# Patient Record
Sex: MALE | Race: WHITE | Employment: OTHER | URBAN - METROPOLITAN AREA
[De-identification: names, ages, dates, MRNs, and addresses within clinical notes are randomized per-mention and may not be internally consistent; named-entity substitution may affect disease eponyms.]

---

## 2017-07-08 ENCOUNTER — IP HISTORICAL/CONVERTED ENCOUNTER (OUTPATIENT)
Dept: OTHER | Age: 77
End: 2017-07-08

## 2019-07-06 ENCOUNTER — HOSPITAL ENCOUNTER (OUTPATIENT)
Dept: MRI IMAGING | Age: 79
Discharge: HOME | End: 2019-07-06
Attending: ORTHOPAEDIC SURGERY
Payer: MEDICARE

## 2019-07-06 DIAGNOSIS — M48.062 SPINAL STENOSIS, LUMBAR REGION, WITH NEUROGENIC CLAUDICATION: ICD-10-CM

## 2019-07-06 PROCEDURE — 72148 MRI LUMBAR SPINE W/O DYE: CPT

## 2021-07-12 ENCOUNTER — TRANSCRIBE ORDER (OUTPATIENT)
Dept: SCHEDULING | Age: 81
End: 2021-07-12

## 2021-07-12 DIAGNOSIS — M51.36 DDD (DEGENERATIVE DISC DISEASE), LUMBAR: ICD-10-CM

## 2021-07-12 DIAGNOSIS — M54.40 BILATERAL LOW BACK PAIN WITH SCIATICA, SCIATICA LATERALITY UNSPECIFIED, UNSPECIFIED CHRONICITY: Primary | ICD-10-CM

## 2021-07-21 ENCOUNTER — HOSPITAL ENCOUNTER (OUTPATIENT)
Dept: MRI IMAGING | Age: 81
Discharge: HOME | End: 2021-07-21
Attending: ORTHOPAEDIC SURGERY
Payer: MEDICARE

## 2021-07-21 DIAGNOSIS — M51.36 DDD (DEGENERATIVE DISC DISEASE), LUMBAR: ICD-10-CM

## 2021-07-21 DIAGNOSIS — M54.40 BILATERAL LOW BACK PAIN WITH SCIATICA, SCIATICA LATERALITY UNSPECIFIED, UNSPECIFIED CHRONICITY: ICD-10-CM

## 2021-07-21 PROCEDURE — 72148 MRI LUMBAR SPINE W/O DYE: CPT

## 2022-09-09 ENCOUNTER — HOSPITAL ENCOUNTER (OUTPATIENT)
Age: 82
Setting detail: OUTPATIENT SURGERY
Discharge: HOME | End: 2022-09-09
Attending: INTERNAL MEDICINE | Admitting: INTERNAL MEDICINE
Payer: MEDICARE

## 2022-09-09 VITALS
RESPIRATION RATE: 16 BRPM | SYSTOLIC BLOOD PRESSURE: 123 MMHG | OXYGEN SATURATION: 95 % | WEIGHT: 200 LBS | TEMPERATURE: 97.7 F | HEART RATE: 64 BPM | BODY MASS INDEX: 27.09 KG/M2 | DIASTOLIC BLOOD PRESSURE: 63 MMHG | HEIGHT: 72 IN

## 2022-09-09 DIAGNOSIS — R94.39 ABNORMAL STRESS TEST: ICD-10-CM

## 2022-09-09 LAB — INR BLD: 1.2 (ref 0.9–1.2)

## 2022-09-09 PROCEDURE — 77030003390 HC NDL ANGI MRTM -A: Performed by: INTERNAL MEDICINE

## 2022-09-09 PROCEDURE — C1894 INTRO/SHEATH, NON-LASER: HCPCS | Performed by: INTERNAL MEDICINE

## 2022-09-09 PROCEDURE — 99152 MOD SED SAME PHYS/QHP 5/>YRS: CPT | Performed by: INTERNAL MEDICINE

## 2022-09-09 PROCEDURE — 74011250636 HC RX REV CODE- 250/636: Performed by: INTERNAL MEDICINE

## 2022-09-09 PROCEDURE — 85610 PROTHROMBIN TIME: CPT

## 2022-09-09 PROCEDURE — 93458 L HRT ARTERY/VENTRICLE ANGIO: CPT | Performed by: INTERNAL MEDICINE

## 2022-09-09 PROCEDURE — 77030004532 HC CATH ANGI DX IMP BSC -A: Performed by: INTERNAL MEDICINE

## 2022-09-09 PROCEDURE — C1760 CLOSURE DEV, VASC: HCPCS | Performed by: INTERNAL MEDICINE

## 2022-09-09 PROCEDURE — 77030019569 HC BND COMPR RAD TERU -B: Performed by: INTERNAL MEDICINE

## 2022-09-09 PROCEDURE — C1769 GUIDE WIRE: HCPCS | Performed by: INTERNAL MEDICINE

## 2022-09-09 PROCEDURE — 77030040934 HC CATH DIAG DXTERITY MEDT -A: Performed by: INTERNAL MEDICINE

## 2022-09-09 PROCEDURE — 77030013744: Performed by: INTERNAL MEDICINE

## 2022-09-09 PROCEDURE — 77030004522 HC CATH ANGI DX EXPO BSC -A: Performed by: INTERNAL MEDICINE

## 2022-09-09 PROCEDURE — 99153 MOD SED SAME PHYS/QHP EA: CPT | Performed by: INTERNAL MEDICINE

## 2022-09-09 PROCEDURE — 74011000250 HC RX REV CODE- 250: Performed by: INTERNAL MEDICINE

## 2022-09-09 RX ORDER — ACETAMINOPHEN 500 MG
1000 TABLET ORAL
COMMUNITY

## 2022-09-09 RX ORDER — SODIUM CHLORIDE 9 MG/ML
3 INJECTION, SOLUTION INTRAVENOUS CONTINUOUS
Status: DISPENSED | OUTPATIENT
Start: 2022-09-09 | End: 2022-09-09

## 2022-09-09 RX ORDER — MIDAZOLAM HYDROCHLORIDE 1 MG/ML
INJECTION, SOLUTION INTRAMUSCULAR; INTRAVENOUS AS NEEDED
Status: DISCONTINUED | OUTPATIENT
Start: 2022-09-09 | End: 2022-09-09 | Stop reason: HOSPADM

## 2022-09-09 RX ORDER — SODIUM CHLORIDE 9 MG/ML
50 INJECTION, SOLUTION INTRAVENOUS CONTINUOUS
Status: DISCONTINUED | OUTPATIENT
Start: 2022-09-09 | End: 2022-09-09 | Stop reason: HOSPADM

## 2022-09-09 RX ORDER — LIDOCAINE HYDROCHLORIDE 20 MG/ML
INJECTION, SOLUTION INFILTRATION; PERINEURAL AS NEEDED
Status: DISCONTINUED | OUTPATIENT
Start: 2022-09-09 | End: 2022-09-09 | Stop reason: HOSPADM

## 2022-09-09 RX ORDER — HEPARIN SODIUM 1000 [USP'U]/ML
INJECTION, SOLUTION INTRAVENOUS; SUBCUTANEOUS AS NEEDED
Status: DISCONTINUED | OUTPATIENT
Start: 2022-09-09 | End: 2022-09-09 | Stop reason: HOSPADM

## 2022-09-09 RX ORDER — WARFARIN SODIUM 5 MG/1
5 TABLET ORAL DAILY
COMMUNITY

## 2022-09-09 RX ORDER — FENTANYL CITRATE 50 UG/ML
INJECTION, SOLUTION INTRAMUSCULAR; INTRAVENOUS AS NEEDED
Status: DISCONTINUED | OUTPATIENT
Start: 2022-09-09 | End: 2022-09-09 | Stop reason: HOSPADM

## 2022-09-09 RX ORDER — METRONIDAZOLE 7.5 MG/G
LOTION TOPICAL 2 TIMES DAILY
COMMUNITY

## 2022-09-09 RX ORDER — LIDOCAINE HYDROCHLORIDE 10 MG/ML
INJECTION INFILTRATION; PERINEURAL AS NEEDED
Status: DISCONTINUED | OUTPATIENT
Start: 2022-09-09 | End: 2022-09-09 | Stop reason: HOSPADM

## 2022-09-09 RX ORDER — METOPROLOL SUCCINATE 25 MG/1
25 TABLET, EXTENDED RELEASE ORAL DAILY
COMMUNITY

## 2022-09-09 RX ORDER — SODIUM CHLORIDE 9 MG/ML
1.5 INJECTION, SOLUTION INTRAVENOUS CONTINUOUS
Status: DISCONTINUED | OUTPATIENT
Start: 2022-09-09 | End: 2022-09-09 | Stop reason: HOSPADM

## 2022-09-09 RX ORDER — VERAPAMIL HYDROCHLORIDE 2.5 MG/ML
INJECTION, SOLUTION INTRAVENOUS AS NEEDED
Status: DISCONTINUED | OUTPATIENT
Start: 2022-09-09 | End: 2022-09-09 | Stop reason: HOSPADM

## 2022-09-09 RX ADMIN — SODIUM CHLORIDE 3 ML/KG/HR: 9 INJECTION, SOLUTION INTRAVENOUS at 08:00

## 2022-09-09 RX ADMIN — SODIUM CHLORIDE 50 ML/HR: 9 INJECTION, SOLUTION INTRAVENOUS at 07:45

## 2022-09-09 NOTE — Clinical Note
Sheath #2: Sheath: inserted. Sheath inserted/placed in the right femoral  artery. Upon evaluation of the common femoral artery stick using fluoroscopy, the access site puncture was within the safe zone.  6F

## 2022-09-09 NOTE — PROGRESS NOTES
TRANSFER - IN REPORT:    Verbal report received from [address] [facility] on Sebastian Pryor  being received from procedure for routine progression of care. Report consisted of patients Situation, Background, Assessment and Recommendations(SBAR). Information from the following report(s) Procedure Summary, MAR, and Recent Results was reviewed with the receiving clinician. Opportunity for questions and clarification was provided. Assessment completed upon patients arrival to [address] [address] and care assumed. Cardiac Cath Lab Recovery Arrival Note:    Stage[facility] arrived to [facility] recovery area. Patient procedure= LHC. Patient on cardiac monitor, non-invasive blood pressure, SPO2 monitor. On O2 @ 2 lpm via n/c. IV  of nacl on pump at 136 ml/hr. Patient status doing well without problems. Patient is A&Ox 4. Patient reports no complaints. PROCEDURE SITE CHECK:    Procedure site:without any bleeding and or hematoma, no pain/discomfort reported at procedure site. No change in patient status. Continue to monitor patient and status.

## 2022-09-09 NOTE — DISCHARGE INSTRUCTIONS
Radial Cardiac Catheterization / Angiography Discharge Instructions    It is normal to feel tired the first couple days. Take it easy and follow the physicians instructions. CHECK THE CATHETER INSERTION SITE DAILY:  Remove the wrist dressing 24 hours after the procedure. You may shower 24 hours after the procedure. Wash with soap and water and pat dry. Gentle cleaning of the site with soap and water is sufficient, cover with a dry clean dressing or bandage. Do not apply creams or powders to the area. No soaking the wrist for 3 days. coveer the puncture site  after 24 hours post-procedure. CALL THE PHYSICIAN:  If the site becomes red, swollen or feels warm to the touch. If there is bleeding or drainage or if there is unusual pain at the radial site. If there is any minor oozing, you may apply a band-aid and remove after 12 hours. If the bleeding continues, hold pressure with the middle finger against the puncture site and the thumb against the back of the wrist, call 911 to be transported to the hospital.  DO NOT DRIVE YOURSELF, [place] [id number]. ACTIVITY:  For the first 24 hours do not manipulate the wrist.  No lifting, pushing or pulling over 3-5 pounds with the affected wrist for 7 days and no straining the insertion site. Do not lift grocery bags or the garbage can, do not run the vacuum  or  for 7 days. Start with short walks as in the hospital and gradually increase as tolerated each day. It is recommended to walk 30 minutes 5-7 days per week. Follow your physicians instructions on activity. Avoid walking outside in extremes of heat or cold. Walk inside when it is cold and windy or hot and humid. THINGS TO KEEP IN MIND:  No driving for at least 24 hours, or as designated by your physician. Limit the number of times you go up and down the stairs  Take rests and pace yourself with activity.   Be careful and do not strain with bowel movements. MEDICATIONS:  Take all medications as prescribed. Call your physician if you have any questions. Keep an updated list of your medications with you at all times and give a list to your physician and pharmacist.    SIGNS AND SYMPTOMS:  Be cautions of symptoms of angina or recurrent symptoms such as chest discomfort, unusual shortness of breath or fatigue. These could be symptoms of restenosis, a new blockage or a heart attack. If your symptoms are relieved with rest it is still recommended that you notify your physician of recurrent chest pain or discomfort. For CHEST PAIN or symptoms of angina not relieved with rest:  If the discomfort is not relieved with rest and you have been prescribed Nitroglycerin, take as directed (taken under the tongue, one at a time 5 minutes apart for a total of 3 doses). If the discomfort is not relieved after the 3rd nitroglycerin, call 911. AFTER CARE:  Follow up with you physician as instructed. Follow a heart healthy diet with proper portion control, daily stress management, daily      exercise, blood pressure and cholesterol control, and smoking cessation.

## 2022-09-09 NOTE — Clinical Note
Cardiac Cath Lab Procedure Area Arrival Note:    Govind Rick arrived to Cardiac Cath Lab, Procedure Area. Patient identifiers verified with NAME and DATE OF BIRTH. Procedure verified with patient. Consent forms verified. Allergies verified. Patient informed of procedure and plan of care. Questions answered with review. Patient voiced understanding of procedure and plan of care. Patient on cardiac monitor, non-invasive blood pressure, SPO2 monitor. On room air, placed on O2 @ 2 lpm via nasal cannula. IV of NaCl on pump at 272 ml/hr. Patient status doing well without problems. Patient is A&Ox 4. Patient reports no chest pain or SOB. Patient medicated during procedure with orders obtained and verified by Dr. Jacque Cao. Refer to patients Cardiac Cath Lab PROCEDURE REPORT for vital signs, assessment, status, and response during procedure, printed at end of case. Printed report on chart or scanned into chart.

## 2022-09-09 NOTE — PROGRESS NOTES
Cardiac Cath Lab Recovery Arrival Note:      Marco Monday arrived to Cardiac Cath Lab, Recovery Area. Staff introduced to patient. Patient identifiers verified with NAME and DATE OF BIRTH. Procedure verified with patient. Consent forms reviewed and signed by patient or authorized representative and verified. Allergies verified. Patient and family oriented to department. Patient and family informed of procedure and plan of care. Questions answered with review. Patient prepped for procedure, per orders from physician, prior to arrival.    Patient on cardiac monitor, non-invasive blood pressure, SPO2 monitor. On room air. Patient is A&Ox 4. Patient reports left shoulder pain. Patient in stretcher, in low position, with side rails up, call bell within reach, patient instructed to call if assistance as needed. Patient prep in: [address], [place].    Patient family has pager # [phone]  Family in: wife Leanne Noss  [phone]  in hospital.   Prep by: Jennifer Harris RN  [address] Dr [address] Horton in to talk with pt  Pre cath teaching completed

## 2022-09-09 NOTE — Clinical Note
History and physical documented and up to date, allergies reviewed, lab results reviewed, pre-procedure education provided, patient verbalized understanding of procedure, procedural consent signed and patient is NPO. Yes

## 2022-10-17 ENCOUNTER — APPOINTMENT (OUTPATIENT)
Dept: GENERAL RADIOLOGY | Age: 82
DRG: 291 | End: 2022-10-17
Attending: EMERGENCY MEDICINE
Payer: MEDICARE

## 2022-10-17 ENCOUNTER — HOSPITAL ENCOUNTER (INPATIENT)
Age: 82
LOS: 5 days | Discharge: HOME | DRG: 291 | End: 2022-10-22
Attending: STUDENT IN AN ORGANIZED HEALTH CARE EDUCATION/TRAINING PROGRAM | Admitting: INTERNAL MEDICINE
Payer: MEDICARE

## 2022-10-17 DIAGNOSIS — R06.01 ORTHOPNEA: Primary | ICD-10-CM

## 2022-10-17 DIAGNOSIS — I50.23 ACUTE ON CHRONIC SYSTOLIC CONGESTIVE HEART FAILURE (HCC): ICD-10-CM

## 2022-10-17 LAB
ALBUMIN SERPL-MCNC: 3.5 G/DL (ref 3.5–5)
ALBUMIN/GLOB SERPL: 1 {RATIO} (ref 1.1–2.2)
ALP SERPL-CCNC: 61 U/L (ref 45–117)
ALT SERPL-CCNC: 20 U/L (ref 12–78)
ANION GAP SERPL CALC-SCNC: 5 MMOL/L (ref 5–15)
AST SERPL-CCNC: 17 U/L (ref 15–37)
BASOPHILS # BLD: 0.1 K/UL (ref 0–0.1)
BASOPHILS NFR BLD: 1 % (ref 0–1)
BILIRUB SERPL-MCNC: 0.5 MG/DL (ref 0.2–1)
BNP SERPL-MCNC: 5219 PG/ML
BUN SERPL-MCNC: 19 MG/DL (ref 6–20)
BUN/CREAT SERPL: 15 (ref 12–20)
CALCIUM SERPL-MCNC: 8.7 MG/DL (ref 8.5–10.1)
CALCULATED R AXIS, ECG10: 53 DEGREES
CALCULATED T AXIS, ECG11: -119 DEGREES
CHLORIDE SERPL-SCNC: 109 MMOL/L (ref 97–108)
CO2 SERPL-SCNC: 25 MMOL/L (ref 21–32)
COMMENT, HOLDF: NORMAL
CREAT SERPL-MCNC: 1.27 MG/DL (ref 0.7–1.3)
D DIMER PPP FEU-MCNC: 0.42 MG/L FEU (ref 0–0.65)
DIAGNOSIS, 93000: NORMAL
DIFFERENTIAL METHOD BLD: NORMAL
EOSINOPHIL # BLD: 0.1 K/UL (ref 0–0.4)
EOSINOPHIL NFR BLD: 2 % (ref 0–7)
ERYTHROCYTE [DISTWIDTH] IN BLOOD BY AUTOMATED COUNT: 13.5 % (ref 11.5–14.5)
GLOBULIN SER CALC-MCNC: 3.4 G/DL (ref 2–4)
GLUCOSE SERPL-MCNC: 122 MG/DL (ref 65–100)
HCT VFR BLD AUTO: 40 % (ref 36.6–50.3)
HGB BLD-MCNC: 13.4 G/DL (ref 12.1–17)
IMM GRANULOCYTES # BLD AUTO: 0 K/UL (ref 0–0.04)
IMM GRANULOCYTES NFR BLD AUTO: 0 % (ref 0–0.5)
INR PPP: 2.9 (ref 0.9–1.1)
LYMPHOCYTES # BLD: 1.7 K/UL (ref 0.8–3.5)
LYMPHOCYTES NFR BLD: 27 % (ref 12–49)
MCH RBC QN AUTO: 32.5 PG (ref 26–34)
MCHC RBC AUTO-ENTMCNC: 33.5 G/DL (ref 30–36.5)
MCV RBC AUTO: 97.1 FL (ref 80–99)
MONOCYTES # BLD: 0.5 K/UL (ref 0–1)
MONOCYTES NFR BLD: 7 % (ref 5–13)
NEUTS SEG # BLD: 4 K/UL (ref 1.8–8)
NEUTS SEG NFR BLD: 63 % (ref 32–75)
NRBC # BLD: 0 K/UL (ref 0–0.01)
NRBC BLD-RTO: 0 PER 100 WBC
PLATELET # BLD AUTO: 207 K/UL (ref 150–400)
PMV BLD AUTO: 12.1 FL (ref 8.9–12.9)
POTASSIUM SERPL-SCNC: 4.6 MMOL/L (ref 3.5–5.1)
PROT SERPL-MCNC: 6.9 G/DL (ref 6.4–8.2)
PROTHROMBIN TIME: 28.4 SEC (ref 9–11.1)
Q-T INTERVAL, ECG07: 404 MS
QRS DURATION, ECG06: 164 MS
QTC CALCULATION (BEZET), ECG08: 510 MS
RBC # BLD AUTO: 4.12 M/UL (ref 4.1–5.7)
SAMPLES BEING HELD,HOLD: NORMAL
SODIUM SERPL-SCNC: 139 MMOL/L (ref 136–145)
TROPONIN-HIGH SENSITIVITY: 18 NG/L (ref 0–76)
TSH SERPL DL<=0.05 MIU/L-ACNC: 3.27 UIU/ML (ref 0.36–3.74)
VENTRICULAR RATE, ECG03: 96 BPM
WBC # BLD AUTO: 6.3 K/UL (ref 4.1–11.1)

## 2022-10-17 PROCEDURE — 93005 ELECTROCARDIOGRAM TRACING: CPT

## 2022-10-17 PROCEDURE — 71046 X-RAY EXAM CHEST 2 VIEWS: CPT

## 2022-10-17 PROCEDURE — 74011000250 HC RX REV CODE- 250: Performed by: INTERNAL MEDICINE

## 2022-10-17 PROCEDURE — 85025 COMPLETE CBC W/AUTO DIFF WBC: CPT

## 2022-10-17 PROCEDURE — 83880 ASSAY OF NATRIURETIC PEPTIDE: CPT

## 2022-10-17 PROCEDURE — 74011250636 HC RX REV CODE- 250/636: Performed by: INTERNAL MEDICINE

## 2022-10-17 PROCEDURE — 74011250636 HC RX REV CODE- 250/636: Performed by: STUDENT IN AN ORGANIZED HEALTH CARE EDUCATION/TRAINING PROGRAM

## 2022-10-17 PROCEDURE — 74011250637 HC RX REV CODE- 250/637: Performed by: INTERNAL MEDICINE

## 2022-10-17 PROCEDURE — 85379 FIBRIN DEGRADATION QUANT: CPT

## 2022-10-17 PROCEDURE — 84443 ASSAY THYROID STIM HORMONE: CPT

## 2022-10-17 PROCEDURE — 36415 COLL VENOUS BLD VENIPUNCTURE: CPT

## 2022-10-17 PROCEDURE — 84484 ASSAY OF TROPONIN QUANT: CPT

## 2022-10-17 PROCEDURE — 99285 EMERGENCY DEPT VISIT HI MDM: CPT

## 2022-10-17 PROCEDURE — 80053 COMPREHEN METABOLIC PANEL: CPT

## 2022-10-17 PROCEDURE — 65270000046 HC RM TELEMETRY

## 2022-10-17 PROCEDURE — 85610 PROTHROMBIN TIME: CPT

## 2022-10-17 PROCEDURE — 94761 N-INVAS EAR/PLS OXIMETRY MLT: CPT

## 2022-10-17 RX ORDER — WARFARIN SODIUM 5 MG/1
5 TABLET ORAL ONCE
Status: COMPLETED | OUTPATIENT
Start: 2022-10-17 | End: 2022-10-17

## 2022-10-17 RX ORDER — FUROSEMIDE 10 MG/ML
40 INJECTION INTRAMUSCULAR; INTRAVENOUS EVERY 12 HOURS
Status: DISCONTINUED | OUTPATIENT
Start: 2022-10-17 | End: 2022-10-19

## 2022-10-17 RX ORDER — ASPIRIN 81 MG/1
81 TABLET ORAL DAILY
Status: DISCONTINUED | OUTPATIENT
Start: 2022-10-18 | End: 2022-10-22 | Stop reason: HOSPADM

## 2022-10-17 RX ORDER — ONDANSETRON 4 MG/1
4 TABLET, ORALLY DISINTEGRATING ORAL
Status: DISCONTINUED | OUTPATIENT
Start: 2022-10-17 | End: 2022-10-22 | Stop reason: HOSPADM

## 2022-10-17 RX ORDER — ACETAMINOPHEN 650 MG/1
650 SUPPOSITORY RECTAL
Status: DISCONTINUED | OUTPATIENT
Start: 2022-10-17 | End: 2022-10-22 | Stop reason: HOSPADM

## 2022-10-17 RX ORDER — SODIUM CHLORIDE 0.9 % (FLUSH) 0.9 %
5-40 SYRINGE (ML) INJECTION AS NEEDED
Status: DISCONTINUED | OUTPATIENT
Start: 2022-10-17 | End: 2022-10-22 | Stop reason: HOSPADM

## 2022-10-17 RX ORDER — METOPROLOL SUCCINATE 25 MG/1
25 TABLET, EXTENDED RELEASE ORAL
Status: DISCONTINUED | OUTPATIENT
Start: 2022-10-17 | End: 2022-10-22 | Stop reason: HOSPADM

## 2022-10-17 RX ORDER — ONDANSETRON 2 MG/ML
4 INJECTION INTRAMUSCULAR; INTRAVENOUS
Status: DISCONTINUED | OUTPATIENT
Start: 2022-10-17 | End: 2022-10-22 | Stop reason: HOSPADM

## 2022-10-17 RX ORDER — TAMSULOSIN HYDROCHLORIDE 0.4 MG/1
0.4 CAPSULE ORAL 2 TIMES DAILY
Status: DISCONTINUED | OUTPATIENT
Start: 2022-10-17 | End: 2022-10-19

## 2022-10-17 RX ORDER — ACETAMINOPHEN 325 MG/1
650 TABLET ORAL
Status: DISCONTINUED | OUTPATIENT
Start: 2022-10-17 | End: 2022-10-22 | Stop reason: HOSPADM

## 2022-10-17 RX ORDER — FUROSEMIDE 10 MG/ML
20 INJECTION INTRAMUSCULAR; INTRAVENOUS ONCE
Status: COMPLETED | OUTPATIENT
Start: 2022-10-17 | End: 2022-10-17

## 2022-10-17 RX ORDER — POLYETHYLENE GLYCOL 3350 17 G/17G
17 POWDER, FOR SOLUTION ORAL DAILY PRN
Status: DISCONTINUED | OUTPATIENT
Start: 2022-10-17 | End: 2022-10-22 | Stop reason: HOSPADM

## 2022-10-17 RX ORDER — SODIUM CHLORIDE 0.9 % (FLUSH) 0.9 %
5-40 SYRINGE (ML) INJECTION EVERY 8 HOURS
Status: DISCONTINUED | OUTPATIENT
Start: 2022-10-17 | End: 2022-10-22 | Stop reason: HOSPADM

## 2022-10-17 RX ADMIN — SODIUM CHLORIDE, PRESERVATIVE FREE 10 ML: 5 INJECTION INTRAVENOUS at 23:19

## 2022-10-17 RX ADMIN — METOPROLOL SUCCINATE 25 MG: 25 TABLET, FILM COATED, EXTENDED RELEASE ORAL at 23:19

## 2022-10-17 RX ADMIN — TAMSULOSIN HYDROCHLORIDE 0.4 MG: 0.4 CAPSULE ORAL at 23:19

## 2022-10-17 RX ADMIN — FUROSEMIDE 40 MG: 10 INJECTION, SOLUTION INTRAVENOUS at 23:19

## 2022-10-17 RX ADMIN — FUROSEMIDE 20 MG: 10 INJECTION, SOLUTION INTRAVENOUS at 17:10

## 2022-10-17 RX ADMIN — WARFARIN SODIUM 5 MG: 5 TABLET ORAL at 18:52

## 2022-10-17 RX ADMIN — SODIUM CHLORIDE, PRESERVATIVE FREE 10 ML: 5 INJECTION INTRAVENOUS at 18:53

## 2022-10-17 NOTE — CONSULTS
[facility] Cardiology Consult Note    Date of consult:  10/17/22  Date of admission: 10/17/2022  Primary Cardiologist: Dr Mark Thompson  Physician Requesting consult: Dr Rosy Kearns / Reason for consult: \"fluid overload\"    History of the presenting illness:  Ayse Hanna is a 80 y.o. M who presents to the ER today with worsening dyspnea. He was seen in clinic early today by Dr Mark Thompson. Has been noted to have a newly reduced EF associated with LBBB along with shortness of breath. Cardiac cath was done and this showed no significant CAD but confirmed increased LV filling pressure and significantly reduced EF. He has been on lasix 20mg daily but this has not been helping. He has dyspnea on mild exertion and complains of orthopnea. He has a h/o mitral valve disease s/p prior mitral valve repair. He has chronic atrial fibrillation and is on metoprolol for rate control and Coumadin for anticoagulation. Past Medical History:   Diagnosis Date    Arrhythmia     MURMUR    Arthritis     CAD (coronary artery disease)     STENTED 2/14/2000    Chronic pain     spinal stenosis    Hypertension     Other ill-defined conditions(799.89)     increased cholesterol    Other ill-defined conditions(799.89)     subclavian artery occlusion    Other ill-defined conditions(799.89)     PROSTATE HYPERTROPHY       Prior to Admission medications    Medication Sig Start Date End Date Taking? Authorizing Provider   warfarin (COUMADIN) 5 mg tablet Take 5 mg by mouth daily. Sat sun wed  7.5 mg    Provider, Historical   metoprolol succinate (TOPROL-XL) 25 mg XL tablet Take 25 mg by mouth daily. Provider, Historical   metroNIDAZOLE (METROLOTION) 0.75 % lotion Apply  to affected area two (2) times a day. Provider, Historical   acetaminophen (Tylenol Extra Strength) 500 mg tablet Take 1,000 mg by mouth every six (6) hours as needed for Pain.     Provider, Historical   nitroglycerin (NITROSTAT) 0.4 mg SL tablet by SubLINGual route every five (5) minutes as needed for Chest Pain. Provider, Historical   tamsulosin (FLOMAX) 0.4 mg capsule Take 0.4 mg by mouth nightly. Provider, Historical   aspirin 81 mg tablet Take 81 mg by mouth daily. 5/3/11   Provider, Historical   simvastatin (ZOCOR) 40 mg tablet Take  by mouth daily. 5/3/11   Provider, Historical       Allergies   Allergen Reactions    Lipitor [Atorvastatin] Myalgia        Family History   Problem Relation Age of Onset    Colon Cancer Mother     Cancer Mother         colon/breast    Colon Cancer Brother         colon/lung/liver    Breast Cancer Sister     Cancer Father         melanoma    Macular Degen Father     Anesth Problems Neg Hx     Other Sister         thrombosis     No pertinent CV family history    Social History     Socioeconomic History    Marital status:      Spouse name: Not on file    Number of children: Not on file    Years of education: Not on file    Highest education level: Not on file   Occupational History    Not on file   Tobacco Use    Smoking status: Former     Packs/day: 2.00     Years: 15.00     Pack years: 30.00     Types: Cigarettes     Quit date: 5/3/1972     Years since quittin.4    Smokeless tobacco: Never   Substance and Sexual Activity    Alcohol use: Yes     Comment: 2 x week a beer in summer    Drug use: No    Sexual activity: Not on file   Other Topics Concern    Not on file   Social History Narrative    Not on file     Social Determinants of Health     Financial Resource Strain: Not on file   Food Insecurity: Not on file   Transportation Needs: Not on file   Physical Activity: Not on file   Stress: Not on file   Social Connections: Not on file   Intimate Partner Violence: Not on file   Housing Stability: Not on file       Review of Systems   Constitutional:  Negative for chills and fever. HENT:  Negative for hearing loss and nosebleeds. Eyes:  Negative for blurred vision and double vision.    Respiratory:  Positive for shortness of breath. Negative for cough and sputum production. Cardiovascular:  Positive for orthopnea. Negative for chest pain and leg swelling. Gastrointestinal:  Negative for abdominal pain, nausea and vomiting. Genitourinary:  Negative for frequency and urgency. Musculoskeletal:  Negative for back pain and joint pain. Skin:  Negative for itching and rash. Neurological:  Negative for dizziness and headaches. Endo/Heme/Allergies:  Negative for environmental allergies. Does not bruise/bleed easily. Visit Vitals  BP (!) 104/56   Pulse 87   Temp 98 °F (36.7 °C)   Resp 19   SpO2 100%     Physical Exam  HENT:      Head: Normocephalic and atraumatic. Nose: Nose normal.   Eyes:      General: No scleral icterus. Conjunctiva/sclera: Conjunctivae normal.   Cardiovascular:      Rate and Rhythm: Normal rate and regular rhythm. Heart sounds: Normal heart sounds. No murmur heard. No gallop. Pulmonary:      Effort: Pulmonary effort is normal. No respiratory distress. Breath sounds: Normal breath sounds. No stridor. No wheezing. Abdominal:      General: There is no distension. Palpations: Abdomen is soft. Musculoskeletal:         General: No deformity. Normal range of motion. Skin:     General: Skin is warm and dry. Neurological:      General: No focal deficit present. Mental Status: He is alert.        Lab review:  BMP:   Lab Results   Component Value Date/Time     10/17/2022 01:54 PM    K 4.6 10/17/2022 01:54 PM     (H) 10/17/2022 01:54 PM    CO2 25 10/17/2022 01:54 PM    AGAP 5 10/17/2022 01:54 PM     (H) 10/17/2022 01:54 PM    BUN 19 10/17/2022 01:54 PM    CREA 1.27 10/17/2022 01:54 PM        CBC:  Lab Results   Component Value Date/Time    WBC 6.3 10/17/2022 01:54 PM    HGB 13.4 10/17/2022 01:54 PM    HCT 40.0 10/17/2022 01:54 PM    PLATELET 949 91/48/1921 01:54 PM    MCV 97.1 10/17/2022 01:54 PM       All Cardiac Markers in the last 24 hours:    Lab Results Component Value Date/Time    BNPNT 5,219 (H) 10/17/2022 01:54 PM       No results found for: CHOL, CHOLPOCT, CHOLX, CHLST, CHOLV, HDL, HDLPOC, HDLP, LDL, LDLCPOC, LDLC, DLDLP, VLDLC, VLDL, TGLX, TRIGL, TRIGP, TGLPOCT, CHHD, CHHDX     Data review:  EKG tracing personally reviewed: afib, LBBB    Echocardiogram:  From [facility] records: 8/10/22  EF- 20-25%dilated hypertrophied left ventricle. Stable appearing mitral valve repair with appropriate gradient and mild MR. Other imaging:  CXR    IMPRESSION  1. Blunted costophrenic angles suggesting small pleural effusions with  associated passive atelectasis and/or consolidation. Left heart cath 9/9/22  Essentially normal coronaries, co-dominant  Mild to moderate LV systolic dysfunction, EF 35 to 40%  Probable stress cardiomyopathy, anterior and apical hypokinesis  Elevated LVEDP at 23 mmHg    Assessment & Recommendations / Plan:    Acute on chronic systolic heart failure  Failed outpatient therapy  Agree with admission for IV diuretics  -> lasix 40mg bid  -> monitor Is and Os, labs closely  -> try to start Entresto tomorrow if BP and labs allow once he's diuresed a bit    Non-ischemic cardiomyopathy  EF reduced since August  Cath recently did not show any obstructive disease  There was consideration of takotsubo but the time course of this doesn't seem characteristic. 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 Permanent atrial fibrillation  Rate controlled and anticoagulated    Mitral valve disease s/p prior MV repair  Mild residual MR on recent echo    Signed:  Torito Hernandez  Interventional Cardiology  10/17/22

## 2022-10-17 NOTE — ED PROVIDER NOTES
HPI     Date of Service:  10/17/2022    Patient:  Brendan Choi    Chief Complaint:  Shortness of Breath       HPI:  Brendan Choi is a 80 y.o.  male with a past medical history of HTN, CAD, CHF with EF 35-40%, atrial fibrillation on Coumadin who presents for evaluation of shortness of breath. Per patient, he underwent a heart catheterization approximately 1 month ago, after the procedure he followed up with his cardiologist who started him on Lasix. Notes despite the Lasix he is still having shortness of breath. His shortness of breath has been worsening over the last 1 month. States it is particularly worse when laying down at night causing him to have to sleep in a recliner. Also endorses shortness of breath with duration. No chest pain. No leg swelling. Notes he has been taking the Lasix without any relief of his shortness of breath. He denies any associated cough. No other recent illness.     Past Medical History:   Diagnosis Date    Arrhythmia     MURMUR    Arthritis     CAD (coronary artery disease)     STENTED 2/14/2000    Chronic pain     spinal stenosis    Hypertension     Other ill-defined conditions(799.89)     increased cholesterol    Other ill-defined conditions(799.89)     subclavian artery occlusion    Other ill-defined conditions(799.89)     PROSTATE HYPERTROPHY       Past Surgical History:   Procedure Laterality Date    COLONOSCOPY N/A 5/9/2016    COLONOSCOPY performed by Lory Capellan MD at [facility] ENDOSCOPY    HX GI      COLONOSCOPY     [address]    mastoid surgery - object in ear    HX ORTHOPAEDIC  2014    spinal stenosis surgery    HX OTHER SURGICAL  1998    2 subclavian artery stents    VASCULAR SURGERY PROCEDURE UNLIST      CARDIAC CATH WITH STENT         Family History:   Problem Relation Age of Onset    Colon Cancer Mother     Cancer Mother         colon/breast    Colon Cancer Brother         colon/lung/liver    Breast Cancer Sister     Cancer Father         melanoma Macular Degen Father     Anesth Problems Neg Hx     Other Sister         thrombosis       Social History     Socioeconomic History    Marital status:      Spouse name: Not on file    Number of children: Not on file    Years of education: Not on file    Highest education level: Not on file   Occupational History    Not on file   Tobacco Use    Smoking status: Former     Packs/day: 2.00     Years: 15.00     Pack years: 30.00     Types: Cigarettes     Quit date: 5/3/1972     Years since quittin.4    Smokeless tobacco: Never   Substance and Sexual Activity    Alcohol use: Yes     Comment: 2 x week a beer in summer    Drug use: No    Sexual activity: Not on file   Other Topics Concern    Not on file   Social History Narrative    Not on file     Social Determinants of Health     Financial Resource Strain: Not on file   Food Insecurity: Not on file   Transportation Needs: Not on file   Physical Activity: Not on file   Stress: Not on file   Social Connections: Not on file   Intimate Partner Violence: Not on file   Housing Stability: Not on file         ALLERGIES: Lipitor [atorvastatin]    Review of Systems   Constitutional:  Negative for chills and fever. HENT:  Negative for congestion and rhinorrhea. Eyes:  Negative for discharge and redness. Respiratory:  Positive for shortness of breath. Negative for cough. Cardiovascular:  Negative for chest pain and leg swelling. Gastrointestinal:  Negative for abdominal pain, diarrhea, nausea and vomiting. Genitourinary:  Negative for dysuria and hematuria. Musculoskeletal:  Positive for arthralgias. Negative for back pain. Skin:  Negative for rash. Neurological:  Negative for speech difficulty and headaches. Psychiatric/Behavioral:  Negative for agitation and confusion.       Vitals:    10/17/22 1339 10/17/22 1528   BP:  (!) 104/56   Pulse: 92 87   Resp: 18 19   Temp: 98 °F (36.7 °C)    SpO2: 97% 100%            Physical Exam  Vitals and nursing note reviewed. Constitutional:       Appearance: Normal appearance. HENT:      Head: Normocephalic. Eyes:      Extraocular Movements: Extraocular movements intact. Conjunctiva/sclera: Conjunctivae normal.   Cardiovascular:      Rate and Rhythm: Normal rate. Rhythm irregular. Pulses: Normal pulses. Pulmonary:      Effort: Pulmonary effort is normal. No respiratory distress. Breath sounds: Examination of the right-lower field reveals decreased breath sounds. Examination of the left-lower field reveals decreased breath sounds. Decreased breath sounds present. Abdominal:      General: Abdomen is flat. Palpations: Abdomen is soft. Tenderness: There is no abdominal tenderness. Musculoskeletal:         General: Normal range of motion. Right lower leg: No edema. Left lower leg: No edema. Skin:     General: Skin is warm and dry. Capillary Refill: Capillary refill takes less than 2 seconds. Neurological:      General: No focal deficit present. Mental Status: He is alert and oriented to person, place, and time. Psychiatric:         Mood and Affect: Mood normal.         Behavior: Behavior normal.        MDM  Number of Diagnoses or Management Options  Acute on chronic systolic congestive heart failure ([address]  Orthopnea  Diagnosis management comments:     DECISION MAKING:  Devorah Cole is a 80 y.o. male who comes in as above. Arrival to the emergency department patient is afebrile. His vital signs are stable. No hypoxia. On my examination he is mildly tachypneic with speaking, but is speaking in full sentences. He has diminished breath sounds at the bilateral bases. Patient was referred from his cardiologist office for CHF exacerbation with plan for likely admission. Patient's work-up is notable for a BNP of 5219. Chest x-ray does show small pleural effusions bilaterally.   Given he is on outpatient Lasix without response, I feel he requires admission for IV diuresis in conjunction with cardiology consultation. 20mg IV lasix ordered. Perfect Serve Consult for Admission  4:19 PM    ED Room Number: ER03/03  Patient Name and age:  Debbie Mccoy 80 y.o.  male  Working Diagnosis: Orthopnea  (primary encounter diagnosis)  Acute on chronic systolic congestive heart failure ([address]    COVID-19 Suspicion:  no  Sepsis present:  no  Reassessment needed: no  Code Status:  Full Code  Readmission: no  Isolation Requirements:  no  Recommended Level of Care:  telemetry  Department:[facility] Adult ED - 21   Other:  80 y.o.  male with a past medical history of HTN, CAD, CHF with EF 35-40%, atrial fibrillation on Coumadin who presents for evaluation of shortness of breath, sent in my cardiology for CHF exacerbation and admission. IV lasix ordered. ED Course as of 10/17/22 1610   Mon Oct 17, 2022   1350   ED EKG interpretation:  Rhythm: Atrial fibrillation with ventricular response of 96 with left axis deviation and left bundle branch block morphology. ST segment:  No concerning ST elevations or depressions. No recent EKGs in the system to compare to. This EKG was interpreted by Merary Sunshine MD,ED Provider.  [JM]      ED Course User Index  [JM] Tony Vasquez MD       Procedures      LABS:  Recent Results (from the past 6 hour(s))   EKG, 12 LEAD, INITIAL    Collection Time: 10/17/22  1:46 PM   Result Value Ref Range    Ventricular Rate 96 BPM    QRS Duration 164 ms    Q-T Interval 404 ms    QTC Calculation (Bezet) 510 ms    Calculated R Axis 53 degrees    Calculated T Axis -119 degrees    Diagnosis       Atrial fibrillation with premature ventricular or aberrantly conducted   complexes  Left bundle branch block  Abnormal ECG  When compared with ECG of 18-AUG-2014 10:18,  Atrial fibrillation has replaced Sinus rhythm  Left bundle branch block is now present     CBC WITH AUTOMATED DIFF    Collection Time: 10/17/22  1:54 PM   Result Value Ref Range    WBC 6.3 4.1 - 11.1 K/uL    RBC 4.12 4.10 - 5.70 M/uL    HGB 13.4 12.1 - 17.0 g/dL    HCT 40.0 36.6 - 50.3 %    MCV 97.1 80.0 - 99.0 FL    MCH 32.5 26.0 - 34.0 PG    MCHC 33.5 30.0 - 36.5 g/dL    RDW 13.5 11.5 - 14.5 %    PLATELET 466 689 - 954 K/uL    MPV 12.1 8.9 - 12.9 FL    NRBC 0.0 0  WBC    ABSOLUTE NRBC 0.00 0.00 - 0.01 K/uL    NEUTROPHILS 63 32 - 75 %    LYMPHOCYTES 27 12 - 49 %    MONOCYTES 7 5 - 13 %    EOSINOPHILS 2 0 - 7 %    BASOPHILS 1 0 - 1 %    IMMATURE GRANULOCYTES 0 0.0 - 0.5 %    ABS. NEUTROPHILS 4.0 1.8 - 8.0 K/UL    ABS. LYMPHOCYTES 1.7 0.8 - 3.5 K/UL    ABS. MONOCYTES 0.5 0.0 - 1.0 K/UL    ABS. EOSINOPHILS 0.1 0.0 - 0.4 K/UL    ABS. BASOPHILS 0.1 0.0 - 0.1 K/UL    ABS. IMM. GRANS. 0.0 0.00 - 0.04 K/UL    DF AUTOMATED     METABOLIC PANEL, COMPREHENSIVE    Collection Time: 10/17/22  1:54 PM   Result Value Ref Range    Sodium 139 136 - 145 mmol/L    Potassium 4.6 3.5 - 5.1 mmol/L    Chloride 109 (H) 97 - 108 mmol/L    CO2 25 21 - 32 mmol/L    Anion gap 5 5 - 15 mmol/L    Glucose 122 (H) 65 - 100 mg/dL    BUN 19 6 - 20 MG/DL    Creatinine 1.27 0.70 - 1.30 MG/DL    BUN/Creatinine ratio 15 12 - 20      eGFR 56 (L) >60 ml/min/1.73m2    Calcium 8.7 8.5 - 10.1 MG/DL    Bilirubin, total 0.5 0.2 - 1.0 MG/DL    ALT (SGPT) 20 12 - 78 U/L    AST (SGOT) 17 15 - 37 U/L    Alk. phosphatase 61 45 - 117 U/L    Protein, total 6.9 6.4 - 8.2 g/dL    Albumin 3.5 3.5 - 5.0 g/dL    Globulin 3.4 2.0 - 4.0 g/dL    A-G Ratio 1.0 (L) 1.1 - 2.2     SAMPLES BEING HELD    Collection Time: 10/17/22  1:54 PM   Result Value Ref Range    SAMPLES BEING HELD 1RED 1BLU     COMMENT        Add-on orders for these samples will be processed based on acceptable specimen integrity and analyte stability, which may vary by analyte. NT-PRO BNP    Collection Time: 10/17/22  1:54 PM   Result Value Ref Range    NT pro-BNP 5,219 (H) <450 PG/ML        IMAGING:  XR CHEST PA LAT   Final Result   1.  Blunted costophrenic angles suggesting small pleural effusions with   associated passive atelectasis and/or consolidation. Medications During Visit:  Medications - No data to display      IMPRESSION:  1. Orthopnea    2. Acute on chronic systolic congestive heart failure (HCC)        DISPOSITION:  Admitted      Current Discharge Medication List           Follow-up Information    None           The patient is asked to follow-up with their primary care provider in the next several days. They are to call tomorrow for an appointment. The patient is asked to return promptly for any increased concerns or worsening of symptoms. They can return to this emergency department or any other emergency department.

## 2022-10-17 NOTE — ED TRIAGE NOTES
Pt referred by Cardiology for fluid retention. Pt reports taking Lasix with little improvement. Pt c/o SOB.

## 2022-10-17 NOTE — ROUTINE PROCESS
TRANSFER - OUT REPORT:    Verbal report given to Gabriel Davis RN(name) on Raymond Kessler  being transferred to [facility], room 469(unit) for routine progression of care       Report consisted of patients Situation, Background, Assessment and   Recommendations(SBAR). Information from the following report(s) SBAR, ED Summary, Intake/Output, MAR, and Recent Results was reviewed with the receiving nurse. Lines:   Peripheral IV 10/17/22 Right Antecubital (Active)   Site Assessment Clean, dry, & intact 10/17/22 1401   Phlebitis Assessment 0 10/17/22 1401   Infiltration Assessment 0 10/17/22 1401   Dressing Status Clean, dry, & intact 10/17/22 1401   Dressing Type Transparent 10/17/22 1401   Hub Color/Line Status Pink 10/17/22 1401        Opportunity for questions and clarification was provided.       Patient transported with:   [employer]

## 2022-10-17 NOTE — H&P
[facility] Adult  Hospitalist Group  History and Physical    Date of Service:  10/17/2022  Primary Care Provider: Jw Campos MD  Source of information: The patient    Chief Complaint: Shortness of Breath      History of Presenting Illness:   Angie Baez is a 80 y.o. male with known past medical history of chronic systolic heart failure with left ventricular ejection fraction 35%, chronic atrial fibrillation and atrial flutter on Coumadin at therapeutic range, coronary artery disease history of cardiac stent in 2000, left bundle branch block, dyslipidemia, degenerative joint disease spinal stenosis who presents to ED with complaining of shortness of breath that gradually increased in severity. The patient was evaluated as outpatient and was prescribed Lasix 20 mg p.o. daily but despite of that he continue experience worsening of shortness of breath, decrease physical activity, he noted orthopnea. The patient denied having any fever productive cough, chest pain, but he admits he has some tightness in the chest aggravated by activity. He denied having any nausea vomiting abdominal pain or diarrhea, or sick contact. In the emergency department patient was evaluated, laboratory data was obtained, was significant for elevated BNP as 5000, chest x-ray was significant for some small pleural effusion. The patient recently had cardiac cath in September 2022 without significant coronary artery disease. According to patient he did have some work around his house, and was moving boxes. Cardiologist consulted, medicine was consulted for admission and further evaluation.     The patient denies any headache, blurry vision, sore throat, trouble swallowing, trouble with speech, cough, fever, chills, N/V/D, abd pain, urinary symptoms, constipation, recent travels, sick contacts, focal or generalized neurological symptoms, falls, injuries, rashes, contact with COVID-19 diagnosed patients, hematemesis, melena, hemoptysis, hematuria, rashes, denies starting any new medications and denies any other concerns or problems besides as mentioned above. REVIEW OF SYSTEMS:  A comprehensive review of systems was negative except for that written in the History of Present Illness. Past Medical History:   Diagnosis Date    Arrhythmia     MURMUR    Arthritis     CAD (coronary artery disease)     STENTED 2/14/2000    Chronic pain     spinal stenosis    Hypertension     Other ill-defined conditions(799.89)     increased cholesterol    Other ill-defined conditions(799.89)     subclavian artery occlusion    Other ill-defined conditions(799.89)     PROSTATE HYPERTROPHY      Past Surgical History:   Procedure Laterality Date    COLONOSCOPY N/A 5/9/2016    COLONOSCOPY performed by Cristino Haynes MD at [facility] ENDOSCOPY    HX GI      COLONOSCOPY     [address]    mastoid surgery - object in ear    HX ORTHOPAEDIC  2014    spinal stenosis surgery    HX OTHER SURGICAL  1998    2 subclavian artery stents    VASCULAR SURGERY PROCEDURE UNLIST      CARDIAC CATH WITH STENT     Prior to Admission medications    Medication Sig Start Date End Date Taking? Authorizing Provider   warfarin (COUMADIN) 5 mg tablet Take 5 mg by mouth daily. Sat sun wed  7.5 mg    Provider, Historical   metoprolol succinate (TOPROL-XL) 25 mg XL tablet Take 25 mg by mouth daily. Provider, Historical   metroNIDAZOLE (METROLOTION) 0.75 % lotion Apply  to affected area two (2) times a day. Provider, Historical   acetaminophen (Tylenol Extra Strength) 500 mg tablet Take 1,000 mg by mouth every six (6) hours as needed for Pain. Provider, Historical   nitroglycerin (NITROSTAT) 0.4 mg SL tablet by SubLINGual route every five (5) minutes as needed for Chest Pain. Provider, Historical   tamsulosin (FLOMAX) 0.4 mg capsule Take 0.4 mg by mouth nightly. Provider, Historical   aspirin 81 mg tablet Take 81 mg by mouth daily.  5/3/11   Provider, Historical   simvastatin (ZOCOR) 40 mg tablet Take  by mouth daily. 5/3/11   Provider, Historical     Allergies   Allergen Reactions    Lipitor [Atorvastatin] Myalgia      Family History   Problem Relation Age of Onset    Colon Cancer Mother     Cancer Mother         colon/breast    Colon Cancer Brother         colon/lung/liver    Breast Cancer Sister     Cancer Father         melanoma    Macular Degen Father     Anesth Problems Neg Hx     Other Sister         thrombosis      Social History:  reports that he quit smoking about 50 years ago. He has a 30.00 pack-year smoking history. He has never used smokeless tobacco. He reports current alcohol use. He reports that he does not use drugs. Family and social history were personally reviewed, all pertinent and relevant details are outlined as above. Objective:   Visit Vitals  /66   Pulse 74   Temp 98 °F (36.7 °C)   Resp 13   SpO2 93%      O2 Device: None (Room air)    PHYSICAL EXAM:   General: Alert x oriented x 3, awake, no acute distress, resting in bed, pleasant male, appears to be stated age  HEENT: PEERL, EOMI, moist mucus membranes  Neck: Supple, no JVD, no meningeal signs  Chest: Coarse to auscultation bilaterally   CVS: IRIR, Aflutter/Afib, rate controlled, S1 S2 heard, no rubs/gallops  Abd: Soft, non-tender, non-distended, +bowel sounds   Ext: No clubbing, no cyanosis, no edema  Neuro/Psych: Pleasant mood and affect, CN 2-12 grossly intact, sensory grossly within normal limit, Strength 5/5 in all extremities, DTR 1+ x 4  Cap refill: Brisk, less than 3 seconds  Pulses: 2+, symmetric in all extremities  Skin: Warm, dry, without rashes or lesions    Data Review: All diagnostic labs and studies have been reviewed.     Abnormal Labs Reviewed   METABOLIC PANEL, COMPREHENSIVE - Abnormal; Notable for the following components:       Result Value    Chloride 109 (*)     Glucose 122 (*)     eGFR 56 (*)     A-G Ratio 1.0 (*)     All other components within normal limits   NT-PRO BNP - Abnormal; Notable for the following components:    NT pro-BNP 5,219 (*)     All other components within normal limits   PROTHROMBIN TIME + INR - Abnormal; Notable for the following components:    INR 2.9 (*)     Prothrombin time 28.4 (*)     All other components within normal limits       All Micro Results       None            IMAGING:   XR CHEST PA LAT   Final Result   1. Blunted costophrenic angles suggesting small pleural effusions with   associated passive atelectasis and/or consolidation. ECG/ECHO:    Results for orders placed or performed during the hospital encounter of 10/17/22   EKG, 12 LEAD, INITIAL   Result Value Ref Range    Ventricular Rate 96 BPM    QRS Duration 164 ms    Q-T Interval 404 ms    QTC Calculation (Bezet) 510 ms    Calculated R Axis 53 degrees    Calculated T Axis -119 degrees    Diagnosis       Atrial fibrillation with premature ventricular or aberrantly conducted   complexes  Left bundle branch block  Abnormal ECG  When compared with ECG of 18-AUG-2014 10:18,  Atrial fibrillation has replaced Sinus rhythm  Left bundle branch block is now present          Assessment:   Given the patient's current clinical presentation, there is a high level of concern for decompensation if discharged from the emergency department. Complex decision making was performed, which includes reviewing the patient's available past medical records, laboratory results, and imaging studies.     Active Problems:    Acute on chronic systolic HF (heart failure) ([facility]) (10/17/2022)    Shortness of breath    Nonischemic cardiomyopathy    Chronic atrial fibrillation/atrial flutter with left bundle branch block, ventricular rate controlled    Coronary artery disease s/p cardiac stent placement in 2000    Dyslipidemia    Status post mitral valve repair  Plan:     The patient will be admitted to medicine, telemetry unit is inpatient and will require at least 2 midnight for inpatient treatment. IV Lasix was initiated and will be continued. Will monitor urine output and kidney function  Echocardiogram will be obtained. Cardiology was consulted, and will adjust medication. Coumadin will be continued, pharmacy will be consulted to Coumadin dose  Home medication will be reconciled and reinstated  Physical therapy will evaluate patient      DIET: ADULT DIET Regular; Low Fat/Low Chol/High Fiber/2 gm Na   ISOLATION PRECAUTIONS: There are currently no Active Isolations  CODE STATUS: Full Code   DVT PROPHYLAXIS: Coumadin  FUNCTIONAL STATUS PRIOR TO HOSPITALIZATION: Fully active and ambulatory; able to carry on all self-care without restriction. Ambulatory status/function: By self   EARLY MOBILITY ASSESSMENT: Recommend an assessment from physical therapy and/or occupational therapy  ANTICIPATED DISCHARGE: 24-48 hours. ANTICIPATED DISPOSITION: Home with Home Healthcare  EMERGENCY CONTACT/SURROGATE DECISION MAKER: wife    CRITICAL CARE WAS PERFORMED FOR THIS ENCOUNTER: NO.      Signed By: Storm Enriquez MD     October 17, 2022         Please note that this dictation may have been completed with Dragon, the computer voice recognition software. Quite often unanticipated grammatical, syntax, homophones, and other interpretive errors are inadvertently transcribed by the computer software. Please disregard these errors. Please excuse any errors that have escaped final proofreading.

## 2022-10-17 NOTE — PROGRESS NOTES
Pharmacist Note - Warfarin Dosing  Consult provided for this 82 y.o.male to manage warfarin for Atrial Fibrillation    INR Goal: 2 - 3    Home regimen/ tablet size: 7.5 mg Sat, Sun, Wed; 5 mg M/T/Th/Fri    Drugs that may increase INR: None  Drugs that may decrease INR: None  Other current anticoagulants/ drugs that may increase bleeding risk: Aspirin  Risk factors: Age > 65  Daily INR ordered: YES    Recent Labs     10/17/22  1354   HGB 13.4   INR 2.9*     Date               INR                  Dose  10/17  2.9   5 mg                                                                                Assessment/ Plan: Will order warfarin 5 mg PO x 1 dose. Pharmacy will continue to monitor daily and adjust therapy as indicated. Please contact the pharmacist at  for outpatient recommendations if needed.

## 2022-10-18 ENCOUNTER — APPOINTMENT (OUTPATIENT)
Dept: NON INVASIVE DIAGNOSTICS | Age: 82
DRG: 291 | End: 2022-10-18
Attending: INTERNAL MEDICINE
Payer: MEDICARE

## 2022-10-18 LAB
ALBUMIN SERPL-MCNC: 3.6 G/DL (ref 3.5–5)
ALBUMIN/GLOB SERPL: 1.2 {RATIO} (ref 1.1–2.2)
ALP SERPL-CCNC: 62 U/L (ref 45–117)
ALT SERPL-CCNC: 19 U/L (ref 12–78)
ANION GAP SERPL CALC-SCNC: 11 MMOL/L (ref 5–15)
AST SERPL-CCNC: 7 U/L (ref 15–37)
BASOPHILS # BLD: 0.1 K/UL (ref 0–0.1)
BASOPHILS NFR BLD: 1 % (ref 0–1)
BILIRUB SERPL-MCNC: 0.4 MG/DL (ref 0.2–1)
BUN SERPL-MCNC: 25 MG/DL (ref 6–20)
BUN/CREAT SERPL: 24 (ref 12–20)
CALCIUM SERPL-MCNC: 8.9 MG/DL (ref 8.5–10.1)
CHLORIDE SERPL-SCNC: 107 MMOL/L (ref 97–108)
CO2 SERPL-SCNC: 23 MMOL/L (ref 21–32)
CREAT SERPL-MCNC: 1.03 MG/DL (ref 0.7–1.3)
DIFFERENTIAL METHOD BLD: NORMAL
ECHO AO ROOT DIAM: 4.3 CM
ECHO AO ROOT INDEX: 2.03 CM/M2
ECHO AR MAX VEL PISA: 3.3 M/S
ECHO AV AREA PEAK VELOCITY: 2.4 CM2
ECHO AV AREA/BSA PEAK VELOCITY: 1.1 CM2/M2
ECHO AV PEAK GRADIENT: 2 MMHG
ECHO AV PEAK VELOCITY: 0.8 M/S
ECHO AV REGURGITANT PHT: 818.2 MILLISECOND
ECHO AV VELOCITY RATIO: 0.38
ECHO EST RA PRESSURE: 10 MMHG
ECHO LA DIAMETER INDEX: 1.89 CM/M2
ECHO LA DIAMETER: 4 CM
ECHO LA TO AORTIC ROOT RATIO: 0.93
ECHO LV E' LATERAL VELOCITY: 8 CM/S
ECHO LV E' SEPTAL VELOCITY: 5 CM/S
ECHO LV FRACTIONAL SHORTENING: 6 % (ref 28–44)
ECHO LV INTERNAL DIMENSION DIASTOLE INDEX: 3.21 CM/M2
ECHO LV INTERNAL DIMENSION DIASTOLIC: 6.8 CM (ref 4.2–5.9)
ECHO LV INTERNAL DIMENSION SYSTOLIC INDEX: 3.02 CM/M2
ECHO LV INTERNAL DIMENSION SYSTOLIC: 6.4 CM
ECHO LV IVSD: 1 CM (ref 0.6–1)
ECHO LV MASS 2D: 249.9 G (ref 88–224)
ECHO LV MASS INDEX 2D: 117.9 G/M2 (ref 49–115)
ECHO LV POSTERIOR WALL DIASTOLIC: 0.7 CM (ref 0.6–1)
ECHO LV RELATIVE WALL THICKNESS RATIO: 0.21
ECHO LVOT AREA: 6.2 CM2
ECHO LVOT DIAM: 2.8 CM
ECHO LVOT PEAK GRADIENT: 0 MMHG
ECHO LVOT PEAK VELOCITY: 0.3 M/S
ECHO MV AREA PHT: 1.9 CM2
ECHO MV E VELOCITY: 1.36 M/S
ECHO MV E/E' LATERAL: 17
ECHO MV E/E' RATIO (AVERAGED): 22.1
ECHO MV E/E' SEPTAL: 27.2
ECHO MV MAX VELOCITY: 1.5 M/S
ECHO MV MEAN GRADIENT: 3 MMHG
ECHO MV MEAN VELOCITY: 0.7 M/S
ECHO MV PEAK GRADIENT: 9 MMHG
ECHO MV PRESSURE HALF TIME (PHT): 115.9 MS
ECHO MV REGURGITANT ALIASING (NYQUIST) VELOCITY: 28 CM/S
ECHO MV REGURGITANT VELOCITY PISA: 4.6 M/S
ECHO MV REGURGITANT VTIA: 173.1 CM
ECHO MV VTI: 39.7 CM
ECHO PV MAX VELOCITY: 0.4 M/S
ECHO PV PEAK GRADIENT: 1 MMHG
ECHO RIGHT VENTRICULAR SYSTOLIC PRESSURE (RVSP): 24 MMHG
ECHO RV FREE WALL PEAK S': 5 CM/S
ECHO RV TAPSE: 1 CM (ref 1.7–?)
ECHO TV REGURGITANT MAX VELOCITY: 1.9 M/S
ECHO TV REGURGITANT PEAK GRADIENT: 14 MMHG
EOSINOPHIL # BLD: 0.2 K/UL (ref 0–0.4)
EOSINOPHIL NFR BLD: 3 % (ref 0–7)
ERYTHROCYTE [DISTWIDTH] IN BLOOD BY AUTOMATED COUNT: 13.2 % (ref 11.5–14.5)
GLOBULIN SER CALC-MCNC: 3 G/DL (ref 2–4)
GLUCOSE SERPL-MCNC: 109 MG/DL (ref 65–100)
HCT VFR BLD AUTO: 40 % (ref 36.6–50.3)
HGB BLD-MCNC: 13.4 G/DL (ref 12.1–17)
IMM GRANULOCYTES # BLD AUTO: 0 K/UL (ref 0–0.04)
IMM GRANULOCYTES NFR BLD AUTO: 0 % (ref 0–0.5)
INR PPP: 3.1 (ref 0.9–1.1)
LACTATE SERPL-SCNC: 0.6 MMOL/L (ref 0.4–2)
LYMPHOCYTES # BLD: 1.4 K/UL (ref 0.8–3.5)
LYMPHOCYTES NFR BLD: 23 % (ref 12–49)
MAGNESIUM SERPL-MCNC: 2.3 MG/DL (ref 1.6–2.4)
MCH RBC QN AUTO: 32.1 PG (ref 26–34)
MCHC RBC AUTO-ENTMCNC: 33.5 G/DL (ref 30–36.5)
MCV RBC AUTO: 95.7 FL (ref 80–99)
MONOCYTES # BLD: 0.5 K/UL (ref 0–1)
MONOCYTES NFR BLD: 9 % (ref 5–13)
NEUTS SEG # BLD: 4 K/UL (ref 1.8–8)
NEUTS SEG NFR BLD: 64 % (ref 32–75)
NRBC # BLD: 0 K/UL (ref 0–0.01)
NRBC BLD-RTO: 0 PER 100 WBC
PLATELET # BLD AUTO: 166 K/UL (ref 150–400)
PMV BLD AUTO: 11 FL (ref 8.9–12.9)
POTASSIUM SERPL-SCNC: 3.8 MMOL/L (ref 3.5–5.1)
PROT SERPL-MCNC: 6.6 G/DL (ref 6.4–8.2)
PROTHROMBIN TIME: 29.8 SEC (ref 9–11.1)
RBC # BLD AUTO: 4.18 M/UL (ref 4.1–5.7)
SODIUM SERPL-SCNC: 141 MMOL/L (ref 136–145)
WBC # BLD AUTO: 6.3 K/UL (ref 4.1–11.1)

## 2022-10-18 PROCEDURE — 80053 COMPREHEN METABOLIC PANEL: CPT

## 2022-10-18 PROCEDURE — 65270000046 HC RM TELEMETRY

## 2022-10-18 PROCEDURE — 74011250637 HC RX REV CODE- 250/637: Performed by: INTERNAL MEDICINE

## 2022-10-18 PROCEDURE — 85610 PROTHROMBIN TIME: CPT

## 2022-10-18 PROCEDURE — 74011250636 HC RX REV CODE- 250/636: Performed by: INTERNAL MEDICINE

## 2022-10-18 PROCEDURE — 97161 PT EVAL LOW COMPLEX 20 MIN: CPT

## 2022-10-18 PROCEDURE — C8929 TTE W OR WO FOL WCON,DOPPLER: HCPCS

## 2022-10-18 PROCEDURE — 36415 COLL VENOUS BLD VENIPUNCTURE: CPT

## 2022-10-18 PROCEDURE — 83605 ASSAY OF LACTIC ACID: CPT

## 2022-10-18 PROCEDURE — 97116 GAIT TRAINING THERAPY: CPT

## 2022-10-18 PROCEDURE — 97165 OT EVAL LOW COMPLEX 30 MIN: CPT

## 2022-10-18 PROCEDURE — 83735 ASSAY OF MAGNESIUM: CPT

## 2022-10-18 PROCEDURE — 94760 N-INVAS EAR/PLS OXIMETRY 1: CPT

## 2022-10-18 PROCEDURE — 74011000250 HC RX REV CODE- 250: Performed by: INTERNAL MEDICINE

## 2022-10-18 PROCEDURE — 85025 COMPLETE CBC W/AUTO DIFF WBC: CPT

## 2022-10-18 PROCEDURE — 97535 SELF CARE MNGMENT TRAINING: CPT

## 2022-10-18 RX ORDER — WARFARIN 4 MG/1
4 TABLET ORAL ONCE
Status: COMPLETED | OUTPATIENT
Start: 2022-10-18 | End: 2022-10-18

## 2022-10-18 RX ADMIN — WARFARIN SODIUM 4 MG: 4 TABLET ORAL at 17:17

## 2022-10-18 RX ADMIN — SODIUM CHLORIDE, PRESERVATIVE FREE 10 ML: 5 INJECTION INTRAVENOUS at 17:30

## 2022-10-18 RX ADMIN — EMPAGLIFLOZIN 10 MG: 10 TABLET, FILM COATED ORAL at 09:14

## 2022-10-18 RX ADMIN — ASPIRIN 81 MG: 81 TABLET, COATED ORAL at 09:14

## 2022-10-18 RX ADMIN — SODIUM CHLORIDE, PRESERVATIVE FREE 10 ML: 5 INJECTION INTRAVENOUS at 20:50

## 2022-10-18 RX ADMIN — FUROSEMIDE 40 MG: 10 INJECTION, SOLUTION INTRAVENOUS at 09:12

## 2022-10-18 RX ADMIN — FUROSEMIDE 40 MG: 10 INJECTION, SOLUTION INTRAVENOUS at 20:47

## 2022-10-18 RX ADMIN — SACUBITRIL AND VALSARTAN 1 TABLET: 24; 26 TABLET, FILM COATED ORAL at 09:14

## 2022-10-18 RX ADMIN — SACUBITRIL AND VALSARTAN 1 TABLET: 24; 26 TABLET, FILM COATED ORAL at 20:48

## 2022-10-18 RX ADMIN — TAMSULOSIN HYDROCHLORIDE 0.4 MG: 0.4 CAPSULE ORAL at 09:14

## 2022-10-18 RX ADMIN — TAMSULOSIN HYDROCHLORIDE 0.4 MG: 0.4 CAPSULE ORAL at 20:47

## 2022-10-18 RX ADMIN — SODIUM CHLORIDE, PRESERVATIVE FREE 10 ML: 5 INJECTION INTRAVENOUS at 09:15

## 2022-10-18 RX ADMIN — PERFLUTREN 1.5 ML: 6.52 INJECTION, SUSPENSION INTRAVENOUS at 08:52

## 2022-10-18 NOTE — PROGRESS NOTES
Pharmacist Note - Warfarin Dosing  Consult provided for this 82 y.o.male to manage warfarin for Atrial Fibrillation    INR Goal: 2 - 3    Home regimen/ tablet size: 7.5 mg S/S/W and 5 mg M/T/Th/Fri    Drugs that may increase INR: None  Drugs that may decrease INR: None  Other current anticoagulants/ drugs that may increase bleeding risk: Aspirin  Risk factors: Age > 65  Daily INR ordered: YES    Recent Labs     10/18/22  0429 10/17/22  1354   HGB 13.4 13.4   INR 3.1* 2.9*     Date               INR                  Dose  10/17  2.9   5 mg   10/18  3.1   4 mg                                                                                Assessment/ Plan: Will order warfarin 4 mg PO x 1 dose. Pharmacy will continue to monitor daily and adjust therapy as indicated. Please contact the pharmacist at  for outpatient recommendations if needed.

## 2022-10-18 NOTE — PROGRESS NOTES
Patient/family seen: YES       Informed patient/family of BPCI-A Bundle Program. Also advised of potential outreach by Care Transitions Team.    Bundle Payment Care Improvement Beneficiary Letter Delivered to Beneficiary or Representative:YES.  Date BCPI -A was given:10/18/22

## 2022-10-18 NOTE — PROGRESS NOTES
Orders received, chart reviewed and patient evaluated by occupational therapy. Pending progression with skilled acute occupational therapy, recommend:  No skilled occupational therapy/ follow up rehabilitation needs identified at this time. Recommend with nursing ADLs with supervision/setup, OOB to chair 3x/day and toileting via functional mobility to and from bathroom SPV assist and cane. Thank you for completing as able in order to maintain patient strength, endurance and independence. Full evaluation to follow.

## 2022-10-18 NOTE — PROGRESS NOTES
2025 TRANSFER - IN REPORT:    Verbal report received from ED(name) on Juancho Francisco  being received from ED(unit) for routine progression of care      Report consisted of patients Situation, Background, Assessment and   Recommendations(SBAR). Information from the following report(s) SBAR, Kardex, ED Summary, Intake/Output, MAR, Recent Results, and Cardiac Rhythm afib  was reviewed with the receiving nurse. Opportunity for questions and clarification was provided. Assessment completed upon patients arrival to unit and care assumed. Problem: Falls - Risk of  Goal: *Absence of Falls  Description: Document Ral Beer Fall Risk and appropriate interventions in the flowsheet.   Outcome: Progressing Towards Goal  Note: Fall Risk Interventions:            Medication Interventions: Evaluate medications/consider consulting pharmacy, Patient to call before getting OOB, Teach patient to arise slowly         History of Falls Interventions: Bed/chair exit alarm, Investigate reason for fall, Evaluate medications/consider consulting pharmacy

## 2022-10-18 NOTE — PROGRESS NOTES
[facility] Cardiology Progress Note    Date of service: 10/18/2022    Subjective:   Breathing improved a lot  No orthopnea overnight  Responded well to IV lasix    Objective:    Visit Vitals  BP (!) 160/43   Pulse 77   Temp 97.6 °F (36.4 °C)   Resp 24   Wt 89.5 kg (197 lb 5 oz)   SpO2 97%   BMI 26.76 kg/m²       Physical Exam  Constitutional:       Appearance: He is well-developed. HENT:      Head: Normocephalic and atraumatic. Eyes:      General: No scleral icterus. Conjunctiva/sclera: Conjunctivae normal.   Neck:      Vascular: No JVD. Cardiovascular:      Rate and Rhythm: Normal rate. Rhythm irregularly irregular. Heart sounds: Normal heart sounds. No murmur heard. No gallop. Pulmonary:      Effort: Pulmonary effort is normal. No respiratory distress. Breath sounds: Normal breath sounds. No stridor. No wheezing or rales. Abdominal:      General: There is no distension. Palpations: Abdomen is soft. Musculoskeletal:         General: No deformity. Skin:     General: Skin is warm and dry. Neurological:      Mental Status: He is alert and oriented to person, place, and time. Psychiatric:         Behavior: Behavior normal.       Data reviewed:  Recent Results (from the past 12 hour(s))   METABOLIC PANEL, COMPREHENSIVE    Collection Time: 10/18/22  4:29 AM   Result Value Ref Range    Sodium 141 136 - 145 mmol/L    Potassium 3.8 3.5 - 5.1 mmol/L    Chloride 107 97 - 108 mmol/L    CO2 23 21 - 32 mmol/L    Anion gap 11 5 - 15 mmol/L    Glucose 109 (H) 65 - 100 mg/dL    BUN 25 (H) 6 - 20 MG/DL    Creatinine 1.03 0.70 - 1.30 MG/DL    BUN/Creatinine ratio 24 (H) 12 - 20      eGFR >60 >60 ml/min/1.73m2    Calcium 8.9 8.5 - 10.1 MG/DL    Bilirubin, total 0.4 0.2 - 1.0 MG/DL    ALT (SGPT) 19 12 - 78 U/L    AST (SGOT) 7 (L) 15 - 37 U/L    Alk.  phosphatase 62 45 - 117 U/L    Protein, total 6.6 6.4 - 8.2 g/dL    Albumin 3.6 3.5 - 5.0 g/dL    Globulin 3.0 2.0 - 4.0 g/dL    A-G Ratio 1.2 1.1 - 2.2 LACTIC ACID    Collection Time: 10/18/22  4:29 AM   Result Value Ref Range    Lactic acid 0.6 0.4 - 2.0 MMOL/L   MAGNESIUM    Collection Time: 10/18/22  4:29 AM   Result Value Ref Range    Magnesium 2.3 1.6 - 2.4 mg/dL   CBC WITH AUTOMATED DIFF    Collection Time: 10/18/22  4:29 AM   Result Value Ref Range    WBC 6.3 4.1 - 11.1 K/uL    RBC 4.18 4.10 - 5.70 M/uL    HGB 13.4 12.1 - 17.0 g/dL    HCT 40.0 36.6 - 50.3 %    MCV 95.7 80.0 - 99.0 FL    MCH 32.1 26.0 - 34.0 PG    MCHC 33.5 30.0 - 36.5 g/dL    RDW 13.2 11.5 - 14.5 %    PLATELET 443 306 - 620 K/uL    MPV 11.0 8.9 - 12.9 FL    NRBC 0.0 0  WBC    ABSOLUTE NRBC 0.00 0.00 - 0.01 K/uL    NEUTROPHILS 64 32 - 75 %    LYMPHOCYTES 23 12 - 49 %    MONOCYTES 9 5 - 13 %    EOSINOPHILS 3 0 - 7 %    BASOPHILS 1 0 - 1 %    IMMATURE GRANULOCYTES 0 0.0 - 0.5 %    ABS. NEUTROPHILS 4.0 1.8 - 8.0 K/UL    ABS. LYMPHOCYTES 1.4 0.8 - 3.5 K/UL    ABS. MONOCYTES 0.5 0.0 - 1.0 K/UL    ABS. EOSINOPHILS 0.2 0.0 - 0.4 K/UL    ABS. BASOPHILS 0.1 0.0 - 0.1 K/UL    ABS. IMM. GRANS. 0.0 0.00 - 0.04 K/UL    DF AUTOMATED     PROTHROMBIN TIME + INR    Collection Time: 10/18/22  4:29 AM   Result Value Ref Range    INR 3.1 (H) 0.9 - 1.1      Prothrombin time 29.8 (H) 9.0 - 11.1 sec       Assessment and Plan:    Acute on chronic systolic heart failure  Failed outpatient therapy  Agree with admission for IV diuretics  -> lasix 40mg IV bid  -> monitor Is and Os, labs closely  -> start Entresto and Jardiance today for CHF  -> may add aldactone tomorrow if labs allow     Non-ischemic cardiomyopathy  EF reduced since August  Cath recently did not show any obstructive disease  There was consideration of takotsubo but the time course of this doesn't seem characteristic. 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      Permanent atrial fibrillation  Rate controlled and anticoagulated  - continue metoprolol and Coumadin     Mitral valve disease s/p prior MV repair  Mild residual MR on recent echo    Signed:  Torito Garay MD  Interventional Cardiology  10/18/2022

## 2022-10-18 NOTE — DISCHARGE INSTRUCTIONS
Discharge Instructions       PATIENT ID: Norberto Cevallos  MRN: [id number]   YOB: 1940    DATE OF ADMISSION: 10/17/2022    DATE OF DISCHARGE: 10/22/2022      DISCHARGING PROVIDER: Dany Anderson MD    To contact this individual call [phone] and ask the  to page. If unavailable ask to be transferred the Adult Hospitalist Department. DISCHARGE DIAGNOSES and CARE RECOMMENDATIONS:   You were admitted and treated for acute on chronic systolic heart failure)    -- Continue taking Toprol 25 mg, furosemide 40 mg orally daily and Jardiance 10 mg daily. You were tried on another heart medicine called Entresto but had to be stopped due to low blood pressure and acute kidney failure. Your cardiologist would assess to see if you are able to tolerate the full guideline directed medical management for your heart failure. Your cardiologist will also determine if and when you will need a defibrillator. Heart Failure: Care Instructions  Your Care Instructions     Heart failure occurs when your heart does not pump as much blood as the body needs. Failure does not mean that the heart has stopped pumping but rather that it is not pumping as well as it should. Over time, this causes fluid buildup in your lungs and other parts of your body. Fluid buildup can cause shortness of breath, fatigue, swollen ankles, and other problems. By taking medicines regularly, reducing sodium (salt) in your diet, checking your weight every day, and making lifestyle changes, you can feel better and live longer. Follow-up care is a key part of your treatment and safety. Be sure to make and go to all appointments, and call your doctor if you are having problems. It's also a good idea to know your test results and keep a list of the medicines you take. How can you care for yourself at home? Medicines    Be safe with medicines. Take your medicines exactly as prescribed.  Call your doctor if you think you are having a problem with your medicine. Do not take any vitamins, over-the-counter medicine, or herbal products without talking to your doctor first. Carmencita Matta not take ibuprofen (Advil or Motrin) and naproxen (Aleve) without talking to your doctor first. They could make your heart failure worse. You may take some of the following medicine. Angiotensin-converting enzyme inhibitors (ACEIs) or angiotensin II receptor blockers (ARBs) reduce the heart's workload, lower blood pressure, and reduce swelling. Taking an ACEI or ARB may lower your chance of needing to be hospitalized. Beta-blockers can slow heart rate, decrease blood pressure, and improve your condition. Taking a beta-blocker may lower your chance of needing to be hospitalized. Diuretics, also called water pills, reduce swelling. You will get more details on the specific medicines your doctor prescribes. Diet    Your doctor may suggest that you limit sodium. Your doctor can tell you how much sodium is right for you. An example is less than 3,000 mg a day. This includes all the salt you eat in cooking or in packaged foods. People get most of their sodium from processed foods. Fast food and restaurant meals also tend to be very high in sodium. Ask your doctor how much liquid you can drink each day. You may have to limit liquids. Weight    Weigh yourself without clothing at the same time each day. Record your weight. Call your doctor if you have a sudden weight gain, such as more than 2 to 3 pounds in a day or 5 pounds in a week. (Your doctor may suggest a different range of weight gain.) A sudden weight gain may mean that your heart failure is getting worse. Activity level    Start light exercise (if your doctor says it is okay). Even if you can only do a small amount, exercise will help you get stronger, have more energy, and manage your weight and your stress. Walking is an easy way to get exercise.  Start out by walking a little more than you did before. Bit by bit, increase the amount you walk. When you exercise, watch for signs that your heart is working too hard. You are pushing yourself too hard if you cannot talk while you are exercising. If you become short of breath or dizzy or have chest pain, stop, sit down, and rest.     If you feel \"wiped out\" the day after you exercise, walk slower or for a shorter distance until you can work up to a better pace. Get enough rest at night. Sleeping with 1 or 2 pillows under your upper body and head may help you breathe easier. Lifestyle changes    Do not smoke. Smoking can make a heart condition worse. If you need help quitting, talk to your doctor about stop-smoking programs and medicines. These can increase your chances of quitting for good. Quitting smoking may be the most important step you can take to protect your heart. Limit alcohol to 2 drinks a day for men and 1 drink a day for women. Too much alcohol can cause health problems. Avoid getting sick from colds and the flu. Get a pneumococcal vaccine shot. If you have had one before, ask your doctor whether you need another dose. Get a flu shot each year. If you must be around people with colds or the flu, wash your hands often. When should you call for help? Call 911  if you have symptoms of sudden heart failure such as: You have severe trouble breathing. You cough up pink, foamy mucus. You have a new irregular or rapid heartbeat. Call your doctor now or seek immediate medical care if:    You have new or increased shortness of breath. You are dizzy or lightheaded, or you feel like you may faint. You have sudden weight gain, such as more than 2 to 3 pounds in a day or 5 pounds in a week. (Your doctor may suggest a different range of weight gain.)     You have increased swelling in your legs, ankles, or feet. You are suddenly so tired or weak that you cannot do your usual activities.    Watch closely for changes in your health, and be sure to contact your doctor if you develop new symptoms. Where can you learn more? Go to http://www.gray.com/  Enter Z729 in the search box to learn more about \"Heart Failure: Care Instructions. \"  Current as of: January 10, 2022               Content Version: 13.4  © 2006-2022 [employer]. Care instructions adapted under license by [employer] (which disclaims liability or warranty for this information). If you have questions about a medical condition or this instruction, always ask your healthcare professional. [place] [address] any warranty or liability for your use of this information. Orthostatic Hypotension: Care Instructions  Your Care Instructions     Orthostatic hypotension is a quick drop in blood pressure. It happens when you get up from sitting or lying down. You may feel faint, lightheaded, or dizzy. When a person sits up or stands up, the body changes the way it pumps blood. This can slow the flow of blood to the brain for a very short time. And that can make you feel lightheaded. Many medicines can cause this problem, especially in older people. Lack of fluids (dehydration) or illnesses such as diabetes or heart disease also can cause it. Follow-up care is a key part of your treatment and safety. Be sure to make and go to all appointments, and call your doctor if you are having problems. It's also a good idea to know your test results and keep a list of the medicines you take. How can you care for yourself at home? Be safe with medicines. Call your doctor if you think you are having a problem with your medicine. You will get more details on the specific medicines your doctor prescribes. If you feel dizzy or lightheaded, sit down or lie down for a few minutes. Or you can sit down and put your head between your knees.  This will help your blood pressure go back to normal and help your symptoms go away. Follow your doctor's suggestions for ways to prevent symptoms like dizziness. These suggestions may include:  Get up slowly from bed or after sitting for a long time. If you are in bed, roll to your side and swing your legs over the edge of the bed and onto the floor. Push your body up to a sitting position. Wait for a while before you slowly stand up. Avoid or limit alcohol to 2 drinks a day for men and 1 drink a day for women. Alcohol may interfere with your medicine. In addition, alcohol can make your low blood pressure worse by causing your body to lose water. You may Wear compression stockings to help improve blood flow. When should you call for help? Call 911 anytime you think you may need emergency care. For example, call if:    You passed out (lost consciousness). Watch closely for changes in your health, and be sure to contact your doctor if:    You do not get better as expected. Where can you learn more? Go to http://www.esparza.com/  Enter V984 in the search box to learn more about \"Orthostatic Hypotension: Care Instructions. \"  Current as of: January 10, 2022               Content Version: 13.4  © 3340-3371 [employer]. Care instructions adapted under license by [employer] (which disclaims liability or warranty for this information). If you have questions about a medical condition or this instruction, always ask your healthcare professional. [address] any warranty or liability for your use of this information.                            DIET:   Regular Diet and Cardiac Diet      ACTIVITY:   Activity as tolerated          PENDING TEST RESULTS:   At the time of discharge the following test results are still pending:  None              DISCHARGE MEDICATIONS:     My Medications        START taking these medications        Instructions Each Dose to Equal Morning Noon Evening Bedtime   empagliflozin 10 mg tablet  Commonly known as: Josh [place]  Start taking on: October 23, 2022    Your last dose was: Your next dose is: Take 1 Tablet by mouth daily for 30 days. 10 mg                 finasteride 5 mg tablet  Commonly known as: PROSCAR  Start taking on: October 23, 2022    Your last dose was: Your next dose is: Take 1 Tablet by mouth daily for 30 days. 5 mg                 furosemide 40 mg tablet  Commonly known as: LASIX    Your last dose was: Your next dose is: Take 1 Tablet by mouth daily for 30 days. 40 mg                        CONTINUE taking these medications        Instructions Each Dose to Equal Morning Noon Evening Bedtime   aspirin 81 mg tablet    Your last dose was: Your next dose is: Take 81 mg by mouth daily. 81 mg                 metoprolol succinate 25 mg XL tablet  Commonly known as: TOPROL-XL    Your last dose was: Your next dose is: Take 25 mg by mouth daily. 25 mg                 metroNIDAZOLE 0.75 % lotion  Commonly known as: METROLOTION    Your last dose was: Your next dose is:         Apply  to affected area two (2) times a day. nitroglycerin 0.4 mg SL tablet  Commonly known as: NITROSTAT    Your last dose was: Your next dose is:         by SubLINGual route every five (5) minutes as needed for Chest Pain.                  simvastatin 40 mg tablet  Commonly known as: ZOCOR    Your last dose was: Your next dose is: Take  by mouth daily. warfarin 5 mg tablet  Commonly known as: COUMADIN    Your last dose was: Your next dose is: Take 5 mg by mouth daily.  Sat sun wed  7.5 mg   5 mg                        STOP taking these medications      tamsulosin 0.4 mg capsule  Commonly known as: FLOMAX               ASK your doctor about these medications        Instructions Each Dose to Equal Morning Noon Evening Bedtime   acetaminophen 500 mg tablet  Commonly known as: TYLENOL    Your last dose was: Your next dose is: Take 1,000 mg by mouth every six (6) hours as needed for Pain.   1,000 mg                           Where to Get Your Medications        These medications were sent to [facility], [address]  [address], [address]       Phone: [phone]   empagliflozin 10 mg tablet  finasteride 5 mg tablet  furosemide 40 mg tablet             It is important that you take the medication exactly as they are prescribed. Keep your medication in the bottles provided by the pharmacist and keep a list of the medication names, dosages, and times to be taken in your wallet. Do not take other medications without consulting your doctor. NOTIFY YOUR PHYSICIAN FOR ANY OF THE FOLLOWING:  If you develop severe shortness of breath, chest pressure/pain, dizziness, presyncope or syncope call 911 to go to the emergency room. Signed:    Ania Mane MD  10/22/2022  2:32 PM

## 2022-10-18 NOTE — NURSE NAVIGATOR
HEART FAILURE NURSE NAVIGATOR NOTE  [address]    Patient chart was reviewed by Heart Failure (HF) Nurse Navigators for compliance of prescribed treatment with guidelines directed medical therapy (GDMT) and HF database completed. Please, review beneath recommendations for symptomatic patients with HF with Reduced Ejection Fraction ? 40% (HFrEF) for your consideration when taking care of this patient. Current Medical Therapy:    Name Ramin Granados    1940   LVEF Echo pending   NYHA Functional Class Documentation needed   ARNi/ACEi/ARB Entresto   Beta-blocker Toprol  XL   Aldosterone Antagonist Per Dr. Holloway Mail may add if labs allow   SGLT2 inhibitor Jardiance 10 mg daily   Hydralazine/Isosorbide Dinitrate    Consulting Cardiologist: FOREST Holloway Mail     Recommendations:    Please, add the following GDMT for HFrEF ? 40% [Class 1] or document in discharge summary/progress note why patient cannot take the medication:  ARNi/ACEi or ARB  Beta-blockers (carvedilol, sustained-release metoprolol succinate or bisoprolol)  Aldosterone antagonists GFR > 30 and K< 5  SGLT2 inhibitor  Hydralazine/Isosorbide dinitrate for  Americans with Class III/IV symptoms  Adjust diuretic dose at discharge if hospitalized for volume overload    Consider adding the following GDMT for HFrEF ? 40%, if appropriate [Class 2b]:   Ivabradine for patients on maximally tolerated beta-blocker dose in order to achieve HR 70-80bpm  Digoxin, goal level 0.5-0.9  Polyunsaturated fatty acids  Vericuguat  For patient with hyperkalemia while on RAASi > 5.5, consider adding potassium binders (patiromer, sodium zirconium cycosilicate)    Note: the following medications may be potentially harmful in heart failure [Class 3]:  Calcium channel blockers (doxazosin, diltiazem, verapamil, nifedipine)  Antiarrhythmics (flecanide, disopyrimide, sotalol, dronedarone)  Diabetes medications (thiasolidinediones, saxagliptin, alogliptin)  NSAIDs and GREGORY 2 inhibitors    Consider vaccinations for respiratory illnesses (flu, pneumonia, covid) [Class 2b]    For eligible patients with LVEF < 35% consider discharge with wearable defibrillation [Class 2b] and/or referral for ICD implantation [Class 1] for prevention of sudden cardiac death. Due to severely reduced LVEF < 25% and/or recurrent hospitalizations this patient may benefit from referral to Advanced Heart Failure Program to assess suitability for advanced therapies, such as left-ventricular assist device, heart transplantation, palliative inotropes or palliation [Class 1]. To obtain in-patient consultation or refer to [address] [place] [address], call [phone]    Patient Education:     Teach back in heart failure education provided, including information about medical therapy, lifestyle modifications, diet and fluid restrictions, physical activity. Educational resources provided: Living with Heart Failure booklet; Signs/Symptoms magnet; Weight Calendar; [facility] flyer; Preparation for Successful Discharge Checklist.  Information provided about HF support group. Heart failure avoiding triggers on discharge instructions. Plan for Transitional Care:    Post discharge follow up phone call to be made within 48-72 hours of discharge. Patient will follow-up with PCP. Patient will follow-up with Primary Cardiologist.  Obstructive sleep apnea screening done and patient was referred to Sleep Medicine. Referral/follow-up with Cardiac Rehabilitation. Referral/follow-up with Advanced Heart Failure Specialist.  Referral/follow-up with Palliative Care Specialist.      Heart Failure Nurse Navigator  Phone: [phone]  /  [phone]    *Recommendations listed above are based on 2022 AHA/ACC/HFSA Guideline for the Management of Heart Failure:  A Report of the [address] [place] [address] Committee on Clinical Practice Guidelines. Circulation 2022; [id number]. and 2017 AHA/ACC/HRS guideline for management of patients with ventricular arrhythmias and the prevention of sudden cardiac death: A Report of the [employer] of Cardiology/American Heart Association Task Force on Clinical Practice Guidelines and the Heart Rhythm Society.  Heart Rhythm, Vol 15, No 10, October 2018 *Class of Recommendation: Class 1 (strong), Class 2a (moderate), Class 2b (weak), Class 3 (not recommended, potentially harmful)

## 2022-10-18 NOTE — PROGRESS NOTES
Transition of Care Plan  RUR 8%    Disposition Home with wife    Cherie Shaw 1 therapy recommending [facility] PT  2x week Patient declined--- he does not want home PT    Medical follow up  PCP and specialist    Contact  Wife Arvin Garcia  [phone]    Reason for Admission:  acute on chronic systolic heart failure  Hx includes CAD, hypertension                  RUR Score:    8%                 Plan for utilizing home health:      will arrange if ordered and patient agrees     PCP: First and Last name:  Jhoana Wagner MD     Name of Practice: [facility]     Are you a current patient: Yes/No: yes   Approximate date of last visit: 10/3/22   Can you participate in a virtual visit with your PCP:                     Current Advanced Directive/Advance Care Plan: Full Code      Healthcare Decision Maker:   Click here to complete [address] including selection of the Healthcare Decision Maker Relationship (ie \"Primary\")                           Transition of Care Plan:        Home with wife and medical follow up    Patient is declining home health     CM met with patient and sonTex  in patient's room to introduce self and explain role. Patient was alert and oriented  Confirmed demographics, PCP and [place]--[place] Generic [place]   [facility]    Self care and independent prior to admission  HH none  Rehab/SNF None  DME  RW grab bars, straight cane     Patient lives with his wife in one level home- with 4 steps to enter in [facility] . He was self care and independent prior to admission using a cane for ambulation and driving. Did not require assistance. He has two adult sons who are supportive. Patient retired a couple of months ago. CM talked with patient about  the recommendation for home health PT and the benefits from having home health   He politely declined.     Medicare pt has received, reviewed, and signed 1st  IM letter informing them of their right to appeal the discharge. Signed copy has been placed on pt bedside chart. CM will follow and assist with any transition of care needs that arise. [facility] if patient changes his mind and agrees. Family will transport home    Care Management Interventions  PCP Verified by CM: Yes  Last Visit to PCP: 10/03/22  Mode of Transport at Discharge: Other (see comment) (family in car)  Transition of Care Consult (CM Consult):  Other  Occupational Therapy Consult: Yes  Speech Therapy Consult: No  Support Systems: Spouse/Significant Other, Child(carrie), Other Family Member(s)  Confirm Follow Up Transport: Family (self)  Discharge Location  Patient Expects to be Discharged to[de-identified] Home with family assistance

## 2022-10-18 NOTE — PROGRESS NOTES
OCCUPATIONAL THERAPY EVALUATION/DISCHARGE  Patient: Danica Sanders (14 y.o. male)  Date: 10/18/2022  Primary Diagnosis: Acute on chronic systolic HF (heart failure) (HCC) [I50.23]       Precautions:   Fall    ASSESSMENT  Based on the objective data described below, the patient presents with near baseline level of ADL/IADL completion, forward flexed posture due to hx of back surgery, reporting that he's been using the bathroom with SPV. Pt completed bathroom mobility and toilet transfers with mod I and SPC, toilet hygiene independently, and grooming ADLs in standing independently. Pt simulated LB dressing using tailor sitting method. Pt likely at baseline level of ADL/IADL independence, skilled OT not recommended upon discharge. OT to discharge at this time. Current Level of Function (ADLs/self-care): mod I bathroom mobility and standing grooming ADLs    Functional Outcome Measure: The patient scored 95/100 on the Barthel index outcome measure which is indicative of independence with basic self care. Other factors to consider for discharge: home support, PLOF     PLAN :  Recommend with staff: bathroom for toileting and grooming ADLs with cane, SPV    Recommendation for discharge: (in order for the patient to meet his/her long term goals)  No skilled occupational therapy/ follow up rehabilitation needs identified at this time. This discharge recommendation:  Has not yet been discussed the attending provider and/or case management    IF patient discharges home will need the following DME: patient owns DME required for discharge       SUBJECTIVE:   Patient stated my wife is bringing my razor later so I can shave.     OBJECTIVE DATA SUMMARY:   HISTORY:   Past Medical History:   Diagnosis Date    Arrhythmia     MURMUR    Arthritis     CAD (coronary artery disease)     STENTED 2/14/2000    Chronic pain     spinal stenosis    Hypertension     Other ill-defined conditions(799.89)     increased cholesterol Other ill-defined conditions(799.89)     subclavian artery occlusion    Other ill-defined conditions(799.89)     PROSTATE HYPERTROPHY     Past Surgical History:   Procedure Laterality Date    COLONOSCOPY N/A 5/9/2016    COLONOSCOPY performed by Kostas Kelley MD at [facility] ENDOSCOPY    HX GI      COLONOSCOPY     [address] [facility]    mastoid surgery - object in ear    HX ORTHOPAEDIC  2014    spinal stenosis surgery    HX OTHER SURGICAL  1998    2 subclavian artery stents    VASCULAR SURGERY PROCEDURE UNLIST      CARDIAC CATH WITH STENT       Prior Level of Function/Environment/Context: Lives in a 1 story house with wife and 24 yo grandchild, mod I with SPC that he uses in the community but not at home. Independent with ADLs/IADLs. Has a walker available at home, grab bars in the shower. 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: Private residence  # Steps to Enter: 4  One/Two Story Residence: One story  Living Alone: No  Support Systems: Spouse/Significant Other  Patient Expects to be Discharged to[de-identified] Home with family assistance  Current DME Used/Available at Home: [address], rolling, Grab bars, Cane, straight  Tub or Shower Type: Shower    Hand dominance: Right    EXAMINATION OF PERFORMANCE DEFICITS:  Cognitive/Behavioral Status:  Neurologic State: Alert  Orientation Level: Oriented X4  Cognition: Appropriate decision making; Appropriate for age attention/concentration; Appropriate safety awareness; Follows commands  Perception: Appears intact  Perseveration: No perseveration noted  Safety/Judgement: Awareness of environment;Home safety; Insight into deficits    Skin: in tact    Edema: none noted    Hearing:   Auditory  Auditory Impairment: Hard of hearing, right side    Vision/Perceptual:                                Corrective Lenses: Reading glasses    Range of Motion:  AROM: Generally decreased, functional  PROM: Within functional limits                      Strength:  Strength: Generally decreased, functional                Coordination:  Coordination: Within functional limits  Fine Motor Skills-Upper: Left Intact; Right Intact    Gross Motor Skills-Upper: Left Intact; Right Intact    Tone & Sensation:  Tone: Normal  Sensation: Intact                      Balance:  Sitting: Intact; Without support  Standing: Impaired; With support  Standing - Static: Fair  Standing - Dynamic : Fair    Functional Mobility and Transfers for ADLs:  Bed Mobility:  Scooting: Independent    Transfers:  Sit to Stand: Supervision  Stand to Sit: Supervision  Bathroom Mobility: Modified independent  Toilet Transfer : Modified independent; Adaptive equipment    ADL Assessment:  Feeding: Independent    Oral Facial Hygiene/Grooming: Modified Independent    Bathing: Modified independent; Adaptive equipment         Upper Body Dressing: Independent    Lower Body Dressing: Independent    Toileting: Independent                ADL Intervention and task modifications:       Grooming  Position Performed: Standing  Washing Face: Independent  Washing Hands: Independent  Brushing Teeth: Set-up                        Lower Body Dressing Assistance  Socks: Independent  Leg Crossed Method Used: Yes  Position Performed: Seated edge of bed    Toileting  Toileting Assistance: Independent  Bladder Hygiene: Independent  Bowel Hygiene: Independent  Clothing Management: Independent    Cognitive Retraining  Safety/Judgement: Awareness of environment;Home safety; Insight into deficits    Functional Measure:    Barthel Index:  Bathin  Bladder: 10  Bowels: 10  Groomin  Dressing: 10  Feeding: 10  Mobility: 15  Stairs: 5  Toilet Use: 10  Transfer (Bed to Chair and Back): 15  Total: 95/100      The Barthel ADL Index: Guidelines  1. The index should be used as a record of what a patient does, not as a record of what a patient could do.   2. The main aim is to establish degree of independence from any help, physical or verbal, however minor and for whatever reason. 3. The need for supervision renders the patient not independent. 4. A patient's performance should be established using the best available evidence. Asking the patient, friends/relatives and nurses are the usual sources, but direct observation and common sense are also important. However direct testing is not needed. 5. Usually the patient's performance over the preceding 24-48 hours is important, but occasionally longer periods will be relevant. 6. Middle categories imply that the patient supplies over 50 per cent of the effort. 7. Use of aids to be independent is allowed. Score Interpretation (from [address] [facility] 83)    Independent   60-79 Minimally independent   40-59 Partially dependent   20-39 Very dependent   <20 Totally dependent     -Kobe Kim., Barthel, DDainaW. (1965). Functional evaluation: the Barthel Index. [address] [facility] ([address] (1997). The Barthel activities of daily living index: self-reporting versus actual performance in the old (> or = 75 years). Journal [address] 45(7), 14 [facility], EMMANUEL, Gadiel Florence, [place]. (1999). Measuring the change in disability after inpatient rehabilitation; comparison of the responsiveness of the Barthel Index and Functional [place] Measure. Journal of Neurology, Neurosurgery, and Psychiatry, 66(4), 476-477. Zeina Early, [place].HALEY, KWAKU Box, & Randy Snyder MLOU. (2004) Assessment of post-stroke quality of life in cost-effectiveness studies: The usefulness of the Barthel Index and the EuroQoL-5D.  Quality of Life Research, 15, 127-71        Occupational Therapy Evaluation Charge Determination   History Examination Decision-Making   LOW Complexity : Brief history review  LOW Complexity : 1-3 performance deficits relating to physical, cognitive , or psychosocial skils that result in activity limitations and / or participation restrictions  LOW Complexity : No comorbidities that affect functional and no verbal or physical assistance needed to complete eval tasks       Based on the above components, the patient evaluation is determined to be of the following complexity level: LOW   Pain Rating:  None reported    Activity Tolerance:   Good, tolerates ADLs without rest breaks, and SpO2 stable on RA    After treatment patient left in no apparent distress:    Call bell within reach, wife present, sitting EOB    COMMUNICATION/EDUCATION:   The patients plan of care was discussed with: Registered nurse. Thank you for this referral.  [facility]  Time Calculation: 22 mins     Regarding student involvement in patient care:  A student participated in this treatment session. Per CMS Medicare statements and AOTA guidelines I certify that the following was true:  1. I was present and directly observed the entire session. 2. I made all skilled judgments and clinical decisions regarding care. 3. I am the practitioner responsible for assessment, treatment, and documentation.

## 2022-10-18 NOTE — PROGRESS NOTES
[facility] Adult  Hospitalist Group                                                                                          Hospitalist Progress Note  Estiven Uribe MD  Answering service: [phone] OR [phone] from in house phone        Date of Service:  10/18/2022  NAME:  Lyle Hughes  :  1940  MRN:  [id number]      Admission Summary:   Lyle Hughes is a 80 y.o. male with known past medical history of chronic systolic heart failure with left ventricular ejection fraction 35%, chronic atrial fibrillation and atrial flutter on Coumadin at therapeutic range, coronary artery disease history of cardiac stent in , left bundle branch block, dyslipidemia, degenerative joint disease spinal stenosis who presents to ED with complaining of shortness of breath that gradually increased in severity. The patient was evaluated as outpatient and was prescribed Lasix 20 mg p.o. daily but despite of that he continue experience worsening of shortness of breath, decrease physical activity, he noted orthopnea. The patient denied having any fever productive cough, chest pain, but he admits he has some tightness in the chest aggravated by activity. He denied having any nausea vomiting abdominal pain or diarrhea, or sick contact. In the emergency department patient was evaluated, laboratory data was obtained, was significant for elevated BNP as 5000, chest x-ray was significant for some small pleural effusion. The patient recently had cardiac cath in 2022 without significant coronary artery disease. According to patient he did have some work around his house, and was moving boxes. Cardiologist consulted, medicine was consulted for admission and further evaluation. Interval history / Subjective:    Follow up acute on chronic systolic HF, SOB  NAD, improved SOB  Not requiring supplemental O2  UOP -3175 ml  ECHO LVEF 20%     Assessment & Plan:     Acute on chronic systolic HF (heart failure) NYHA II ([facility] [address] (10/17/2022), worsening  Shortness of breath  Nonischemic cardiomyopathy  Continue medical management  Cards consulted input appreciated  Continue Lasix IV  Metoprolol  Entresto and Jardiance were added by cards  ECHO: LVEF 20%      Chronic atrial fibrillation/atrial flutter with left bundle branch block,   ventricular rate controlled  Continue metoprolol and Coumadin , INR in therapeutic range    Coronary artery disease s/p cardiac stent placement in 2000  No chest pain  Continue ASA, statins    Dyslipidemia  Statins, will need to have home medications as pt is allergic to Lipitor     Status post mitral valve repair  Continue coumadin       Code status: Full code  Prophylaxis: coumadin  Care Plan discussed with: patient, RN  Anticipated Disposition: TBD, possible home in 1-2 days when [place] per cardiologist       Hospital Problems  Never Reviewed            Codes Class Noted POA    Acute on chronic systolic HF (heart failure) ([facility] ICD-10-CM: F66.33  ICD-9-CM: 428.23  10/17/2022 Unknown             Review of Systems:   A comprehensive review of systems was negative except for that written in the HPI. Vital Signs:    Last 24hrs VS reviewed since prior progress note. Most recent are:  Visit Vitals  BP (!) 104/47 (BP 1 Location: Right upper arm, BP Patient Position: Lying)   Pulse 68   Temp 97.6 °F (36.4 °C)   Resp 14   Ht 6' (1.829 m)   Wt 89.4 kg (197 lb)   SpO2 97%   BMI 26.72 kg/m²         Intake/Output Summary (Last 24 hours) at 10/18/2022 1551  Last data filed at 10/18/2022 1157  Gross per 24 hour   Intake 720 ml   Output 4275 ml   Net -3555 ml        Physical Examination:     I had a face to face encounter with this patient and independently examined them on 10/18/2022 as outlined below:          Constitutional:  No acute distress, cooperative, pleasant    ENT:  Oral mucosa moist, oropharynx benign. Resp:  Coarse bilaterally. No wheezing/rhonchi/rales.  No accessory muscle use. CV:  IRIR, Afib/Aflutter, normal rate, no gallops, rubs    GI:  Soft, non distended, non tender. normoactive bowel sounds, no hepatosplenomegaly     Musculoskeletal:  No edema, warm, 2+ pulses throughout    Neurologic:  Moves all extremities. AAOx3, CN II-XII reviewed            Data Review:    Review and/or order of clinical lab test    ECHO:    Left Ventricle: Severely reduced left ventricular systolic function with a visually estimated EF of 20 - 25%. Left ventricle is moderately dilated. Normal wall thickness. Severe global hypokinesis present. Aortic Valve: Mild sclerosis of the aortic valve cusp. Mild regurgitation. Mitral Valve: Valve repaired. Mild regurgitation. Left Atrium: Left atrium is mildly dilated. Aorta: Normal sized ascending aorta. Mildly dilated aortic root. Ao Root diameter is 4.3 cm. Contrast used: Definity. Labs:     Recent Labs     10/18/22  0429 10/17/22  1354   WBC 6.3 6.3   HGB 13.4 13.4   HCT 40.0 40.0    207     Recent Labs     10/18/22  0429 10/17/22  1354    139   K 3.8 4.6    109*   CO2 23 25   BUN 25* 19   CREA 1.03 1.27   * 122*   CA 8.9 8.7   MG 2.3  --      Recent Labs     10/18/22  0429 10/17/22  1354   ALT 19 20   AP 62 61   TBILI 0.4 0.5   TP 6.6 6.9   ALB 3.6 3.5   GLOB 3.0 3.4     Recent Labs     10/18/22  0429 10/17/22  1354   INR 3.1* 2.9*   PTP 29.8* 28.4*      No results for input(s): FE, TIBC, PSAT, FERR in the last 72 hours. No results found for: FOL, RBCF   No results for input(s): PH, PCO2, PO2 in the last 72 hours. No results for input(s): CPK, CKNDX, TROIQ in the last 72 hours.     No lab exists for component: CPKMB  No results found for: CHOL, CHOLX, CHLST, CHOLV, HDL, HDLP, LDL, LDLC, DLDLP, TGLX, TRIGL, TRIGP, CHHD, CHHDX  No results found for: [facility]  Lab Results   Component Value Date/Time    Color YELLOW/STRAW 08/18/2014 10:38 AM    Appearance CLEAR 08/18/2014 10:38 AM    Specific gravity 1.017 08/18/2014 10:38 AM    pH (UA) 6.0 08/18/2014 10:38 AM    Protein NEGATIVE 08/18/2014 10:38 AM    Glucose NEGATIVE 08/18/2014 10:38 AM    Ketone NEGATIVE 08/18/2014 10:38 AM    Bilirubin NEGATIVE 08/18/2014 10:38 AM    Urobilinogen 0.2 08/18/2014 10:38 AM    Nitrites NEGATIVE 08/18/2014 10:38 AM    Leukocyte Esterase NEGATIVE 08/18/2014 10:38 AM    Epithelial cells FEW 08/18/2014 10:38 AM    Bacteria NEGATIVE 08/18/2014 10:38 AM    WBC 0-4 08/18/2014 10:38 AM    RBC 0-5 08/18/2014 10:38 AM         Medications Reviewed:     Current Facility-Administered Medications   Medication Dose Route Frequency    empagliflozin (JARDIANCE) tablet 10 mg  10 mg Oral DAILY    warfarin (COUMADIN) tablet 4 mg  4 mg Oral ONCE    sodium chloride (NS) flush 5-40 mL  5-40 mL IntraVENous Q8H    sodium chloride (NS) flush 5-40 mL  5-40 mL IntraVENous PRN    acetaminophen (TYLENOL) tablet 650 mg  650 mg Oral Q6H PRN    Or    acetaminophen (TYLENOL) suppository 650 mg  650 mg Rectal Q6H PRN    polyethylene glycol (MIRALAX) packet 17 g  17 g Oral DAILY PRN    ondansetron (ZOFRAN ODT) tablet 4 mg  4 mg Oral Q8H PRN    Or    ondansetron (ZOFRAN) injection 4 mg  4 mg IntraVENous Q6H PRN    metoprolol succinate (TOPROL-XL) XL tablet 25 mg  25 mg Oral QHS    aspirin delayed-release tablet 81 mg  81 mg Oral DAILY    tamsulosin (FLOMAX) capsule 0.4 mg  0.4 mg Oral BID    furosemide (LASIX) injection 40 mg  40 mg IntraVENous Q12H    sacubitriL-valsartan (ENTRESTO) 24-26 mg tablet 1 Tablet  1 Tablet Oral Q12H    Warfarin - pharmacy to dose   Other Rx Dosing/Monitoring     ______________________________________________________________________  EXPECTED LENGTH OF STAY: - - -  ACTUAL LENGTH OF STAY:          1                 Adolph Bansal MD

## 2022-10-18 NOTE — PROGRESS NOTES
Problem: Mobility Impaired (Adult and Pediatric)  Goal: *Acute Goals and Plan of Care (Insert Text)  Description: FUNCTIONAL STATUS PRIOR TO ADMISSION: Patient was modified independent using a single point cane for functional mobility. No falls. Driving. HOME SUPPORT PRIOR TO ADMISSION: The patient lived with his wife but did not require assist.    Physical Therapy Goals  Initiated 10/18/2022  1. Patient will move from supine to sit and sit to supine , scoot up and down, and roll side to side in bed with modified independence within 7 day(s). 2.  Patient will transfer from bed to chair and chair to bed with modified independence using the least restrictive device within 7 day(s). 3.  Patient will perform sit to stand with modified independence within 7 day(s). 4.  Patient will ambulate with modified independence for 150 feet with the least restrictive device within 7 day(s). 5.  Patient will ascend/descend 4 stairs with right handrail(s) with modified independence within 7 day(s). Outcome: Progressing Towards Goal   PHYSICAL THERAPY EVALUATION  Patient: Valentin Hamilton (49 y.o. male)  Date: 10/18/2022  Primary Diagnosis: Acute on chronic systolic HF (heart failure) ([facility]) [I50.23]  Precautions:   Fall      ASSESSMENT  Based on the objective data described below, the patient presents with impaired balance, mild generalized weakness, forward flexion in standing due to history of lumbar degenerative disc disease, and decreased endurance following admission for CHF and need IV diuretics. Participated in ambulation trial with his personal SPC and required only CGA for safety. SpO2 stable on room air and HR in 130's with ambulation-- SOB noted but stated it was better than when he presented to ED. Would recommend HHPT but patient politely denied. Current Level of Function Impacting Discharge (mobility/balance): CGA    Functional Outcome Measure:   The patient scored Total Score: 16/28 on the Tinetti outcome measure which is indicative of high fall risk. Other factors to consider for discharge: lives with wife, near baseline     Patient will benefit from skilled therapy intervention to address the above noted impairments. PLAN :  Recommendations and Planned Interventions: bed mobility training, transfer training, gait training, therapeutic exercises, neuromuscular re-education, edema management/control, patient and family training/education, and therapeutic activities      Frequency/Duration: Patient will be followed by physical therapy:  3 times a week to address goals. Recommendation for discharge: (in order for the patient to meet his/her long term goals)  Physical therapy at least 2 days/week in the home     This discharge recommendation:  Has been made in collaboration with the attending provider and/or case management    IF patient discharges home will need the following DME: patient owns DME required for discharge         SUBJECTIVE:   Patient stated Oh I'm fine.     OBJECTIVE DATA SUMMARY:   HISTORY:    Past Medical History:   Diagnosis Date    Arrhythmia     MURMUR    Arthritis     CAD (coronary artery disease)     STENTED 2/14/2000    Chronic pain     spinal stenosis    Hypertension     Other ill-defined conditions(799.89)     increased cholesterol    Other ill-defined conditions(799.89)     subclavian artery occlusion    Other ill-defined conditions(799.89)     PROSTATE HYPERTROPHY     Past Surgical History:   Procedure Laterality Date    COLONOSCOPY N/A 5/9/2016    COLONOSCOPY performed by Cristino Haynes MD at [facility] ENDOSCOPY    HX GI      COLONOSCOPY     [address] [facility]    mastoid surgery - object in ear    HX ORTHOPAEDIC  2014    spinal stenosis surgery    HX OTHER SURGICAL  1998    2 subclavian artery stents    VASCULAR SURGERY PROCEDURE UNLIST      CARDIAC CATH WITH STENT       Personal factors and/or comorbidities impacting plan of care: PMH    Home Situation  Home Environment: Private residence  # Steps to Enter: 4  One/Two Story Residence: One story  Living Alone: No  Support Systems: Spouse/Significant Other  Patient Expects to be Discharged to[de-identified] Home with family assistance  Current DME Used/Available at Home: Walker, rolling, Grab bars, Cane, straight  Tub or Shower Type: Shower    EXAMINATION/PRESENTATION/DECISION MAKING:   Critical Behavior:  Neurologic State: Alert  Orientation Level: Oriented X4  Cognition: Appropriate decision making, Appropriate for age attention/concentration, Appropriate safety awareness, Follows commands  Safety/Judgement: Awareness of environment, Home safety, Insight into deficits  Hearing: Auditory  Auditory Impairment: Hard of hearing, right side  Range Of Motion:  AROM: Generally decreased, functional  PROM: Within functional limits  Strength:    Strength: Generally decreased, functional  Tone & Sensation:   Tone: Normal  Sensation: Intact  Coordination:  Coordination: Within functional limits  Vision:   Corrective Lenses: Reading glasses  Functional Mobility:  Bed Mobility:  Scooting: Independent  Transfers:  Sit to Stand: Supervision  Stand to Sit: Supervision  Balance:   Sitting: Intact; Without support  Standing: Impaired; With support  Standing - Static: Fair  Standing - Dynamic : Fair  Ambulation/Gait Training:  Distance (ft): 150 Feet (ft)  Assistive Device: Cane, straight  Ambulation - Level of Assistance: Contact guard assistance  Gait Abnormalities: Decreased step clearance (forward flexion)  Base of Support: Narrowed  Speed/Sherron: Pace decreased (<100 feet/min); Shuffled  Step Length: Right shortened;Left shortened    Functional Measure:  Tinetti test:    Sitting Balance: 1  Arises: 1  Attempts to Rise: 2  Immediate Standing Balance: 1  Standing Balance: 1  Nudged: 0  Eyes Closed: 0  Turn 360 Degrees - Continuous/Discontinuous: 0  Turn 360 Degrees - Steady/Unsteady: 1  Sitting Down: 1  Balance Score: 8 Balance total score  Indication of Gait: 1  R Step Length/Height: 1  L Step Length/Height: 1  R Foot Clearance: 1  L Foot Clearance: 1  Step Symmetry: 1  Step Continuity: 1  Path: 1  Trunk: 0  Walking Time: 0  Gait Score: 8 Gait total score  Total Score: 16/28 Overall total score         Tinetti Tool Score Risk of Falls  <19 = High Fall Risk  19-24 = Moderate Fall Risk  25-28 = Low Fall Risk  Tinetti ME. Performance-Oriented Assessment of Mobility Problems in Elderly Patients. [facility]; [id number]. (Scoring Description: PT Bulletin Feb. 10, 1993)    Older adults: Carmen Craig et al, 2009; n = 1000 [facility] elderly evaluated with ABC, KENYON, ADL, and IADL)  · Mean KENYON score for males aged 69-68 years = 26.21(3.40)  · Mean KENYON score for females age 69-68 years = 25.16(4.30)  · Mean KENYON score for males over 80 years = 23.29(6.02)  · Mean KENYON score for females over 80 years = 17.20(8.32)        Physical Therapy Evaluation Charge Determination   History Examination Presentation Decision-Making   HIGH Complexity :3+ comorbidities / personal factors will impact the outcome/ POC  HIGH Complexity : 4+ Standardized tests and measures addressing body structure, function, activity limitation and / or participation in recreation  LOW Complexity : Stable, uncomplicated  Other outcome measures TInetti 16/28  HIGH       Based on the above components, the patient evaluation is determined to be of the following complexity level: LOW     Pain Rating:  Denied pain    Activity Tolerance:   Good and requires rest breaks      After treatment patient left in no apparent distress:   Call bell within reach and sitting EOB    COMMUNICATION/EDUCATION:   The patients plan of care was discussed with: Occupational therapist and Registered nurse. Fall prevention education was provided and the patient/caregiver indicated understanding., Patient/family have participated as able in goal setting and plan of care. , and Patient/family agree to work toward stated goals and plan of care.     Thank you for this referral.  Angelic Wise, PT, DPT   Time Calculation: 16 mins

## 2022-10-19 LAB
ALBUMIN SERPL-MCNC: 3.4 G/DL (ref 3.5–5)
ALBUMIN/GLOB SERPL: 1 {RATIO} (ref 1.1–2.2)
ALP SERPL-CCNC: 68 U/L (ref 45–117)
ALT SERPL-CCNC: 18 U/L (ref 12–78)
ANION GAP SERPL CALC-SCNC: 9 MMOL/L (ref 5–15)
AST SERPL-CCNC: 10 U/L (ref 15–37)
BILIRUB SERPL-MCNC: 0.5 MG/DL (ref 0.2–1)
BUN SERPL-MCNC: 29 MG/DL (ref 6–20)
BUN/CREAT SERPL: 22 (ref 12–20)
CALCIUM SERPL-MCNC: 8.8 MG/DL (ref 8.5–10.1)
CHLORIDE SERPL-SCNC: 102 MMOL/L (ref 97–108)
CO2 SERPL-SCNC: 24 MMOL/L (ref 21–32)
CREAT SERPL-MCNC: 1.34 MG/DL (ref 0.7–1.3)
ERYTHROCYTE [DISTWIDTH] IN BLOOD BY AUTOMATED COUNT: 13.2 % (ref 11.5–14.5)
GLOBULIN SER CALC-MCNC: 3.5 G/DL (ref 2–4)
GLUCOSE SERPL-MCNC: 95 MG/DL (ref 65–100)
HCT VFR BLD AUTO: 44 % (ref 36.6–50.3)
HGB BLD-MCNC: 14.7 G/DL (ref 12.1–17)
INR PPP: 2.7 (ref 0.9–1.1)
MCH RBC QN AUTO: 32.2 PG (ref 26–34)
MCHC RBC AUTO-ENTMCNC: 33.4 G/DL (ref 30–36.5)
MCV RBC AUTO: 96.5 FL (ref 80–99)
NRBC # BLD: 0 K/UL (ref 0–0.01)
NRBC BLD-RTO: 0 PER 100 WBC
PLATELET # BLD AUTO: 195 K/UL (ref 150–400)
PMV BLD AUTO: 11.7 FL (ref 8.9–12.9)
POTASSIUM SERPL-SCNC: 3.8 MMOL/L (ref 3.5–5.1)
PROT SERPL-MCNC: 6.9 G/DL (ref 6.4–8.2)
PROTHROMBIN TIME: 26.9 SEC (ref 9–11.1)
RBC # BLD AUTO: 4.56 M/UL (ref 4.1–5.7)
SODIUM SERPL-SCNC: 135 MMOL/L (ref 136–145)
WBC # BLD AUTO: 7.2 K/UL (ref 4.1–11.1)

## 2022-10-19 PROCEDURE — 65270000046 HC RM TELEMETRY

## 2022-10-19 PROCEDURE — 85610 PROTHROMBIN TIME: CPT

## 2022-10-19 PROCEDURE — 74011250637 HC RX REV CODE- 250/637: Performed by: INTERNAL MEDICINE

## 2022-10-19 PROCEDURE — 36415 COLL VENOUS BLD VENIPUNCTURE: CPT

## 2022-10-19 PROCEDURE — 80053 COMPREHEN METABOLIC PANEL: CPT

## 2022-10-19 PROCEDURE — 85027 COMPLETE CBC AUTOMATED: CPT

## 2022-10-19 PROCEDURE — 74011000250 HC RX REV CODE- 250: Performed by: INTERNAL MEDICINE

## 2022-10-19 RX ORDER — WARFARIN SODIUM 5 MG/1
5 TABLET ORAL ONCE
Status: COMPLETED | OUTPATIENT
Start: 2022-10-19 | End: 2022-10-19

## 2022-10-19 RX ORDER — TAMSULOSIN HYDROCHLORIDE 0.4 MG/1
0.4 CAPSULE ORAL DAILY
Status: DISCONTINUED | OUTPATIENT
Start: 2022-10-20 | End: 2022-10-22 | Stop reason: HOSPADM

## 2022-10-19 RX ORDER — BUMETANIDE 1 MG/1
1 TABLET ORAL DAILY
Status: DISCONTINUED | OUTPATIENT
Start: 2022-10-19 | End: 2022-10-20

## 2022-10-19 RX ORDER — MIDODRINE HYDROCHLORIDE 5 MG/1
2.5 TABLET ORAL
Status: DISCONTINUED | OUTPATIENT
Start: 2022-10-19 | End: 2022-10-21

## 2022-10-19 RX ADMIN — ASPIRIN 81 MG: 81 TABLET, COATED ORAL at 09:25

## 2022-10-19 RX ADMIN — SODIUM CHLORIDE, PRESERVATIVE FREE 10 ML: 5 INJECTION INTRAVENOUS at 16:20

## 2022-10-19 RX ADMIN — EMPAGLIFLOZIN 10 MG: 10 TABLET, FILM COATED ORAL at 09:25

## 2022-10-19 RX ADMIN — SACUBITRIL AND VALSARTAN 1 TABLET: 24; 26 TABLET, FILM COATED ORAL at 09:25

## 2022-10-19 RX ADMIN — SACUBITRIL AND VALSARTAN 1 TABLET: 24; 26 TABLET, FILM COATED ORAL at 20:39

## 2022-10-19 RX ADMIN — SODIUM CHLORIDE, PRESERVATIVE FREE 10 ML: 5 INJECTION INTRAVENOUS at 04:32

## 2022-10-19 RX ADMIN — MIDODRINE HYDROCHLORIDE 2.5 MG: 5 TABLET ORAL at 16:20

## 2022-10-19 RX ADMIN — BUMETANIDE 1 MG: 1 TABLET ORAL at 09:25

## 2022-10-19 RX ADMIN — WARFARIN SODIUM 5 MG: 5 TABLET ORAL at 16:20

## 2022-10-19 NOTE — PROGRESS NOTES
0730: Bedside shift change report given to Chelle Gray and Leandro Delgado RN (oncoming nurse) by JULIO CESAR Crystal (offgoing nurse). Report included the following information SBAR, MAR, and Recent Results. 1930: Bedside shift change report given to Anabel Gar RN (oncoming nurse) by Jordana Mcneil (offgoing nurse). Report included the following information SBAR, MAR, and Recent Results. Charting and patient care of Angie Baez by Sherran Hamman, RN from 0730 to 8587 was supervised and reviewed by this RN.

## 2022-10-19 NOTE — PROGRESS NOTES
0730: Bedside shift change report given to Heather Jackson, JULIO CESAR and Cassius Marcos RN (oncoming nurse) by  Jacek RN (offgoing nurse). Report included the following information SBAR. 1930: Bedside shift change report given to [facility], RN (oncoming nurse) by Cassius Marcos RN and Heather Jackson RN (offgoing nurse). Report included the following information SBAR.

## 2022-10-19 NOTE — PROGRESS NOTES
[address] [facility] Adult  Hospitalist Group                                                                                          Hospitalist Progress Note  Pop Damon MD  Answering service: [phone] from in house phone        Date of Service:  10/19/2022  NAME:  Figueroa Lopez  :  1940  MRN:  [id number]      Admission Summary:   Figueroa Lopez is a 80 y.o. male with known past medical history of chronic systolic heart failure with left ventricular ejection fraction 35%, chronic atrial fibrillation and atrial flutter on Coumadin at therapeutic range, coronary artery disease history of cardiac stent in , left bundle branch block, dyslipidemia, degenerative joint disease spinal stenosis who presents to ED with complaining of shortness of breath that gradually increased in severity. The patient was evaluated as outpatient and was prescribed Lasix 20 mg p.o. daily but despite of that he continue experience worsening of shortness of breath, decrease physical activity, he noted orthopnea. The patient denied having any fever productive cough, chest pain, but he admits he has some tightness in the chest aggravated by activity. He denied having any nausea vomiting abdominal pain or diarrhea, or sick contact. In the emergency department patient was evaluated, laboratory data was obtained, was significant for elevated BNP as 5000, chest x-ray was significant for some small pleural effusion. The patient recently had cardiac cath in 2022 without significant coronary artery disease. According to patient he did have some work around his house, and was moving boxes. Cardiologist consulted, medicine was consulted for admission and further evaluation. Interval history / Subjective:    Follow up acute on chronic systolic HF, SOB  NAD, improved SOB  Not requiring supplemental O2  UOP -2450 ml  ECHO LVEF 20%   Still weak and SOB with activity Assessment & Plan:     Acute on chronic systolic HF (heart failure) NYHA II ([facility] (10/17/2022), worsening  Shortness of breath  Nonischemic cardiomyopathy  Hypotension  Continue medical management  Cards consulted input appreciated  Continue diuretics, Lasix IV changed to Bumex PO  Metoprolol  Entresto and Jardiance were added by cards  ECHO: LVEF 20%   BP is low, SBP 80s  10/19: Metoprolol on hold in am due to hypotension  Medications adjustment per cards  Add midodrine TID        Chronic atrial fibrillation/atrial flutter with left bundle branch block,   ventricular rate controlled  Continue metoprolol and Coumadin, INR in therapeutic range    Coronary artery disease s/p cardiac stent placement in 2000  No chest pain  Continue ASA, statins, will need to have home medications as pt is allergic to Lipitor    Dyslipidemia  Statins, will need to have home medications as pt is allergic to Lipitor     Status post mitral valve repair  Continue coumadin  INR 2.7       Code status: Full code  Prophylaxis: coumadin  Care Plan discussed with: patient, RN  Anticipated Disposition: TBD, when condition of the patient improved and BP is controlled       Hospital Problems  Never Reviewed            Codes Class Noted POA    Acute on chronic systolic HF (heart failure) ([facility] ICD-10-CM: R86.58  ICD-9-CM: 428.23  10/17/2022 Unknown           Review of Systems:   A comprehensive review of systems was negative except for that written in the HPI. Vital Signs:    Last 24hrs VS reviewed since prior progress note.  Most recent are:  Visit Vitals  BP (!) 98/52 (BP 1 Location: Left upper arm, BP Patient Position: At rest;Lying)   Pulse 81   Temp 98.1 °F (36.7 °C)   Resp 18   Ht 6' (1.829 m)   Wt 89.4 kg (197 lb 1.5 oz)   SpO2 97%   BMI 26.73 kg/m²         Intake/Output Summary (Last 24 hours) at 10/19/2022 1138  Last data filed at 10/19/2022 0800  Gross per 24 hour   Intake 775 ml   Output 1950 ml   Net -1175 ml          Physical Examination:     I had a face to face encounter with this patient and independently examined them on 10/19/2022 as outlined below:          Constitutional:  No acute distress, cooperative, pleasant, + weak   ENT:  Oral mucosa moist, oropharynx benign. Resp:  Coarse bilaterally. No wheezing/rhonchi/rales. No accessory muscle use. CV:  IRIR, Afib/Aflutter, normal rate, no gallops, rubs    GI:  Soft, non distended, non tender. normoactive bowel sounds, no hepatosplenomegaly     Musculoskeletal:  No edema, warm, 2+ pulses throughout    Neurologic:  Moves all extremities. AAOx3, CN II-XII reviewed            Data Review:    Review and/or order of clinical lab test    ECHO:    Left Ventricle: Severely reduced left ventricular systolic function with a visually estimated EF of 20 - 25%. Left ventricle is moderately dilated. Normal wall thickness. Severe global hypokinesis present. Aortic Valve: Mild sclerosis of the aortic valve cusp. Mild regurgitation. Mitral Valve: Valve repaired. Mild regurgitation. Left Atrium: Left atrium is mildly dilated. Aorta: Normal sized ascending aorta. Mildly dilated aortic root. Ao Root diameter is 4.3 cm. Contrast used: Definity. Labs:     Recent Labs     10/19/22  0433 10/18/22  0429   WBC 7.2 6.3   HGB 14.7 13.4   HCT 44.0 40.0    166       Recent Labs     10/19/22  0433 10/18/22  0429 10/17/22  1354   * 141 139   K 3.8 3.8 4.6    107 109*   CO2 24 23 25   BUN 29* 25* 19   CREA 1.34* 1.03 1.27   GLU 95 109* 122*   CA 8.8 8.9 8.7   MG  --  2.3  --        Recent Labs     10/19/22  0433 10/18/22  0429 10/17/22  1354   ALT 18 19 20   AP 68 62 61   TBILI 0.5 0.4 0.5   TP 6.9 6.6 6.9   ALB 3.4* 3.6 3.5   GLOB 3.5 3.0 3.4       Recent Labs     10/19/22  0433 10/18/22  0429 10/17/22  1354   INR 2.7* 3.1* 2.9*   PTP 26.9* 29.8* 28.4*        No results for input(s): FE, TIBC, PSAT, FERR in the last 72 hours.    No results found for: FOL, RBCF   No results for input(s): PH, PCO2, PO2 in the last 72 hours. No results for input(s): CPK, CKNDX, TROIQ in the last 72 hours.     No lab exists for component: CPKMB  No results found for: CHOL, CHOLX, CHLST, CHOLV, HDL, HDLP, LDL, LDLC, DLDLP, TGLX, TRIGL, TRIGP, CHHD, CHHDX  No results found for: [facility]  Lab Results   Component Value Date/Time    Color YELLOW/STRAW 08/18/2014 10:38 AM    Appearance CLEAR 08/18/2014 10:38 AM    Specific gravity 1.017 08/18/2014 10:38 AM    pH (UA) 6.0 08/18/2014 10:38 AM    Protein NEGATIVE 08/18/2014 10:38 AM    Glucose NEGATIVE 08/18/2014 10:38 AM    Ketone NEGATIVE 08/18/2014 10:38 AM    Bilirubin NEGATIVE 08/18/2014 10:38 AM    Urobilinogen 0.2 08/18/2014 10:38 AM    Nitrites NEGATIVE 08/18/2014 10:38 AM    Leukocyte Esterase NEGATIVE 08/18/2014 10:38 AM    Epithelial cells FEW 08/18/2014 10:38 AM    Bacteria NEGATIVE 08/18/2014 10:38 AM    WBC 0-4 08/18/2014 10:38 AM    RBC 0-5 08/18/2014 10:38 AM         Medications Reviewed:     Current Facility-Administered Medications   Medication Dose Route Frequency    [START ON 10/20/2022] tamsulosin (FLOMAX) capsule 0.4 mg  0.4 mg Oral DAILY    bumetanide (BUMEX) tablet 1 mg  1 mg Oral DAILY    warfarin (COUMADIN) tablet 5 mg  5 mg Oral ONCE    empagliflozin (JARDIANCE) tablet 10 mg  10 mg Oral DAILY    sodium chloride (NS) flush 5-40 mL  5-40 mL IntraVENous Q8H    sodium chloride (NS) flush 5-40 mL  5-40 mL IntraVENous PRN    acetaminophen (TYLENOL) tablet 650 mg  650 mg Oral Q6H PRN    Or    acetaminophen (TYLENOL) suppository 650 mg  650 mg Rectal Q6H PRN    polyethylene glycol (MIRALAX) packet 17 g  17 g Oral DAILY PRN    ondansetron (ZOFRAN ODT) tablet 4 mg  4 mg Oral Q8H PRN    Or    ondansetron (ZOFRAN) injection 4 mg  4 mg IntraVENous Q6H PRN    metoprolol succinate (TOPROL-XL) XL tablet 25 mg  25 mg Oral QHS    aspirin delayed-release tablet 81 mg  81 mg Oral DAILY    sacubitriL-valsartan (ENTRESTO) 24-26 mg tablet 1 Tablet  1 Tablet Oral Q12H    Warfarin - pharmacy to dose   Other Rx Dosing/Monitoring     ______________________________________________________________________  EXPECTED LENGTH OF STAY: - - -  ACTUAL LENGTH OF STAY:          2                 Qasim Moran MD

## 2022-10-19 NOTE — PROGRESS NOTES
Pharmacist Note - Warfarin Dosing  Consult provided for this 82 y.o.male to manage warfarin for Atrial Fibrillation    INR Goal: 2 - 3    Home regimen/ tablet size: 7.5 mg S/S/W and 5 mg M/T/Th/Fri    Drugs that may increase INR: None  Drugs that may decrease INR: None  Other current anticoagulants/ drugs that may increase bleeding risk: Aspirin  Risk factors: Age > 65  Daily INR ordered: YES    Recent Labs     10/19/22  0433 10/18/22  0429 10/17/22  1354   HGB 14.7 13.4 13.4   INR 2.7* 3.1* 2.9*     Date               INR                  Dose  10/17  2.9   5 mg   10/18  3.1   4 mg   10/19              2.7                   5 mg                                                                               Assessment/ Plan: Will order warfarin 5 mg PO x 1 dose. Pharmacy will continue to monitor daily and adjust therapy as indicated. Please contact the pharmacist at  for outpatient recommendations if needed.

## 2022-10-19 NOTE — PROGRESS NOTES
Physician Progress Note      Sima Guy  CSN #:                  [id number]  :                       1940  ADMIT DATE:       10/17/2022 3:20 PM  DISCH DATE:  RESPONDING  PROVIDER #:        Bonifacio Glover MD          QUERY TEXT:    Pt admitted with Acute on Chronic Systolic CHF. Pt noted to also have CAD, HTN, CM, and MV disease. If possible, please document in progress notes and discharge summary the etiology of CHF, if able to be determined. The medical record reflects the following:  Risk Factors: HTN, CAD, CM, MV Dz  Clinical Indicators: pt with SOB/ orthopnea despite diuretic use. BNP 5219, CXR showing pleural effusion and atelectasis. No edema in BLE. A/C Systolic CHF is documented. Treatment: Lasix IV, Bumex, Jardiance/ Entresto, 2Gm Na diet, I/O, Cardiac consult. Thank you,  Filipe Mata RN  Clinical Documentation  [phone], or via [employer]  Options provided:  -- CHF due to Hypertensive Heart Disease  -- CHF due to Hypertensive Heart Disease and CAD  -- CHF not due to Hypertension but due to CAD  -- CHF due to Hypertensive Heart Disease and CMP  -- CHF not due to Hypertension but due to CMP  -- CHF due to Hypertensive Heart Disease and Valvular Heart Disease  -- CHF not due to Hypertension but due to valvular heart disease  -- CHF due to HTN, CAD, CMP and valvular disease  -- Other - I will add my own diagnosis  -- Disagree - Not applicable / Not valid  -- Disagree - Clinically unable to determine / Unknown  -- Refer to Clinical Documentation Reviewer    PROVIDER RESPONSE TEXT:    This patient has CHF due to HTN, CAD, CMP and valvular heart disease.     Query created by: Morris Carmen on 10/19/2022 11:51 AM      Electronically signed by:  Bonifacio Glover MD 10/19/2022 1:28 PM

## 2022-10-19 NOTE — PROGRESS NOTES
Problem: Falls - Risk of  Goal: *Absence of Falls  Description: Document Valerio Power Fall Risk and appropriate interventions in the flowsheet.   Outcome: Progressing Towards Goal  Note: Fall Risk Interventions:            Medication Interventions: Bed/chair exit alarm         History of Falls Interventions: Bed/chair exit alarm         Problem: Patient Education: Go to Patient Education Activity  Goal: Patient/Family Education  Outcome: Progressing Towards Goal     Problem: Patient Education: Go to Patient Education Activity  Goal: Patient/Family Education  Outcome: Progressing Towards Goal

## 2022-10-19 NOTE — PROGRESS NOTES
0730 Bedside shift change report given to Ladonna Yates (oncoming nurse) by Augie Sanchez (offgoing nurse). Report included the following information SBAR, Kardex, ED Summary, Intake/Output, MAR, and Recent Results. 2000 Bedside shift change report given to  Jacek (oncoming nurse) by Ladonna Yates (offgoing nurse). Report included the following information SBAR, Kardex, ED Summary, Intake/Output, MAR, and Recent Results.

## 2022-10-19 NOTE — PROGRESS NOTES
[facility] Cardiology Progress Note    Date of service: 10/19/2022    Subjective:   Breathing improved  No orthopnea  Mild dyspnea with walking    Objective:    Visit Vitals  BP (!) 98/52 (BP 1 Location: Left upper arm, BP Patient Position: At rest;Lying)   Pulse 67   Temp 98.1 °F (36.7 °C)   Resp 18   Ht 6' (1.829 m)   Wt 89.4 kg (197 lb)   SpO2 97%   BMI 26.72 kg/m²       Physical Exam  Constitutional:       Appearance: He is well-developed. HENT:      Head: Normocephalic and atraumatic. Eyes:      General: No scleral icterus. Conjunctiva/sclera: Conjunctivae normal.   Neck:      Vascular: No JVD. Cardiovascular:      Rate and Rhythm: Normal rate. Rhythm irregularly irregular. Heart sounds: Normal heart sounds. No murmur heard. No gallop. Pulmonary:      Effort: Pulmonary effort is normal. No respiratory distress. Breath sounds: Normal breath sounds. No stridor. No wheezing or rales. Abdominal:      General: There is no distension. Palpations: Abdomen is soft. Musculoskeletal:         General: No deformity. Skin:     General: Skin is warm and dry. Neurological:      Mental Status: He is alert and oriented to person, place, and time. Psychiatric:         Behavior: Behavior normal.       Data reviewed:  No results found for this or any previous visit (from the past 12 hour(s)). Assessment and Plan:    Acute on chronic systolic heart failure  Failed outpatient therapy  Improved with inpatient diuresis.    -> change lasix to PO bumex 1mg daily  -> continue Entresto and Jardiance for CHF  -> BP too low to add aldactone at this time. Non-ischemic cardiomyopathy  EF reduced since August  Cath recently did not show any obstructive disease  There was consideration of takotsubo but the time course of this doesn't seem characteristic. 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      Permanent atrial fibrillation  Rate controlled and anticoagulated  - continue metoprolol and Coumadin     Mitral valve disease s/p prior MV repair  Mild residual MR on recent echo    Dispo: ok for discharge home from CV standpoint on current regimen  FU with Dr Trang Booth in 1 week    Signed:  Octavia Klein.  Patrick Esqueda MD  Interventional Cardiology  10/19/2022

## 2022-10-19 NOTE — PROGRESS NOTES
Physical Therapy    Reviewed chart and attempted to treat pt. Pt BP 80/38 HR 83bpm at rest. Will defer at this time due to hypotension.

## 2022-10-20 ENCOUNTER — TRANSCRIBE ORDER (OUTPATIENT)
Dept: CARDIAC REHAB | Age: 82
End: 2022-10-20

## 2022-10-20 DIAGNOSIS — I50.22 CHRONIC SYSTOLIC HEART FAILURE (HCC): Primary | ICD-10-CM

## 2022-10-20 LAB
ANION GAP SERPL CALC-SCNC: 9 MMOL/L (ref 5–15)
BUN SERPL-MCNC: 33 MG/DL (ref 6–20)
BUN/CREAT SERPL: 24 (ref 12–20)
CALCIUM SERPL-MCNC: 8.6 MG/DL (ref 8.5–10.1)
CHLORIDE SERPL-SCNC: 104 MMOL/L (ref 97–108)
CO2 SERPL-SCNC: 22 MMOL/L (ref 21–32)
CORTIS AM PEAK SERPL-MCNC: 11.3 UG/DL (ref 4.3–22.45)
CREAT SERPL-MCNC: 1.38 MG/DL (ref 0.7–1.3)
GLUCOSE SERPL-MCNC: 112 MG/DL (ref 65–100)
INR PPP: 2.5 (ref 0.9–1.1)
POTASSIUM SERPL-SCNC: 4.9 MMOL/L (ref 3.5–5.1)
PROTHROMBIN TIME: 24.3 SEC (ref 9–11.1)
SODIUM SERPL-SCNC: 135 MMOL/L (ref 136–145)

## 2022-10-20 PROCEDURE — 36415 COLL VENOUS BLD VENIPUNCTURE: CPT

## 2022-10-20 PROCEDURE — 74011250637 HC RX REV CODE- 250/637: Performed by: INTERNAL MEDICINE

## 2022-10-20 PROCEDURE — 82533 TOTAL CORTISOL: CPT

## 2022-10-20 PROCEDURE — 74011250637 HC RX REV CODE- 250/637: Performed by: HOSPITALIST

## 2022-10-20 PROCEDURE — 65270000046 HC RM TELEMETRY

## 2022-10-20 PROCEDURE — 74011000250 HC RX REV CODE- 250: Performed by: INTERNAL MEDICINE

## 2022-10-20 PROCEDURE — 97116 GAIT TRAINING THERAPY: CPT

## 2022-10-20 PROCEDURE — 80048 BASIC METABOLIC PNL TOTAL CA: CPT

## 2022-10-20 PROCEDURE — 85610 PROTHROMBIN TIME: CPT

## 2022-10-20 RX ORDER — FINASTERIDE 5 MG/1
5 TABLET, FILM COATED ORAL DAILY
Status: DISCONTINUED | OUTPATIENT
Start: 2022-10-20 | End: 2022-10-22 | Stop reason: HOSPADM

## 2022-10-20 RX ORDER — WARFARIN SODIUM 5 MG/1
5 TABLET ORAL ONCE
Status: COMPLETED | OUTPATIENT
Start: 2022-10-20 | End: 2022-10-20

## 2022-10-20 RX ORDER — FUROSEMIDE 40 MG/1
40 TABLET ORAL DAILY
Status: DISCONTINUED | OUTPATIENT
Start: 2022-10-20 | End: 2022-10-22 | Stop reason: HOSPADM

## 2022-10-20 RX ADMIN — EMPAGLIFLOZIN 10 MG: 10 TABLET, FILM COATED ORAL at 08:13

## 2022-10-20 RX ADMIN — FINASTERIDE 5 MG: 5 TABLET, FILM COATED ORAL at 08:13

## 2022-10-20 RX ADMIN — SACUBITRIL AND VALSARTAN 1 TABLET: 24; 26 TABLET, FILM COATED ORAL at 21:14

## 2022-10-20 RX ADMIN — SACUBITRIL AND VALSARTAN 1 TABLET: 24; 26 TABLET, FILM COATED ORAL at 08:13

## 2022-10-20 RX ADMIN — SODIUM CHLORIDE, PRESERVATIVE FREE 10 ML: 5 INJECTION INTRAVENOUS at 16:49

## 2022-10-20 RX ADMIN — MIDODRINE HYDROCHLORIDE 2.5 MG: 5 TABLET ORAL at 16:44

## 2022-10-20 RX ADMIN — MIDODRINE HYDROCHLORIDE 2.5 MG: 5 TABLET ORAL at 12:01

## 2022-10-20 RX ADMIN — SODIUM CHLORIDE, PRESERVATIVE FREE 10 ML: 5 INJECTION INTRAVENOUS at 21:14

## 2022-10-20 RX ADMIN — FUROSEMIDE 40 MG: 40 TABLET ORAL at 08:13

## 2022-10-20 RX ADMIN — MIDODRINE HYDROCHLORIDE 2.5 MG: 5 TABLET ORAL at 08:13

## 2022-10-20 RX ADMIN — POLYETHYLENE GLYCOL 3350 17 G: 17 POWDER, FOR SOLUTION ORAL at 16:44

## 2022-10-20 RX ADMIN — SODIUM CHLORIDE, PRESERVATIVE FREE 10 ML: 5 INJECTION INTRAVENOUS at 05:17

## 2022-10-20 RX ADMIN — METOPROLOL SUCCINATE 25 MG: 25 TABLET, FILM COATED, EXTENDED RELEASE ORAL at 21:14

## 2022-10-20 RX ADMIN — WARFARIN SODIUM 5 MG: 5 TABLET ORAL at 16:44

## 2022-10-20 RX ADMIN — ASPIRIN 81 MG: 81 TABLET, COATED ORAL at 08:13

## 2022-10-20 NOTE — PROGRESS NOTES
0730: Bedside shift change report given to Jordana Herrera (oncoming nurse) by Silvana Kowalski RN (offgoing nurse). Report included the following information SBAR, MAR, and Recent Results. 1930: Bedside shift change report given to JULIO CESAR Subramanian (oncoming nurse) by Colin Hernandez RN (offgoing nurse). Report included the following information SBAR, MAR, and Recent Results. Charting and patient care of Valentin Haimlton by Que Lyles RN from 0730 to 2200 was supervised and reviewed by this RN.

## 2022-10-20 NOTE — PROGRESS NOTES
[facility] Cardiology Progress Note    Date of service: 10/20/2022    Subjective:   Breathing improved  No orthopnea  Had some low BP issues yesterday, but that improved over the course of the day    Objective:    Visit Vitals  BP (!) 101/43   Pulse 69   Temp 98 °F (36.7 °C)   Resp 18   Ht 6' (1.829 m)   Wt 89.4 kg (197 lb 1.5 oz)   SpO2 97%   BMI 26.73 kg/m²       Physical Exam  Constitutional:       Appearance: He is well-developed. HENT:      Head: Normocephalic and atraumatic. Eyes:      General: No scleral icterus. Conjunctiva/sclera: Conjunctivae normal.   Neck:      Vascular: No JVD. Cardiovascular:      Rate and Rhythm: Normal rate. Rhythm irregularly irregular. Heart sounds: Normal heart sounds. No murmur heard. No gallop. Pulmonary:      Effort: Pulmonary effort is normal. No respiratory distress. Breath sounds: Normal breath sounds. No stridor. No wheezing or rales. Abdominal:      General: There is no distension. Palpations: Abdomen is soft. Musculoskeletal:         General: No deformity. Skin:     General: Skin is warm and dry. Neurological:      Mental Status: He is alert and oriented to person, place, and time. Psychiatric:         Behavior: Behavior normal.       Data reviewed:  Recent Results (from the past 12 hour(s))   PROTHROMBIN TIME + INR    Collection Time: 10/20/22  5:18 AM   Result Value Ref Range    INR 2.5 (H) 0.9 - 1.1      Prothrombin time 24.3 (H) 9.0 - 11.1 sec   CORTISOL, AM    Collection Time: 10/20/22  5:18 AM   Result Value Ref Range    Cortisol, a.m. 11.3 4.30 - 22.45 ug/dL         Assessment and Plan:    Acute on chronic systolic heart failure  Failed outpatient therapy  Improved with inpatient diuresis.    -> discharge on Lasix 40mg daily  -> continue Entresto and Jardiance for CHF  -> BP too low to add aldactone at this time. Changing Tamsulosin to Finasteride - may have less impact on BP.  Alpha blockers can contribute to orthostatic hypotension. Non-ischemic cardiomyopathy  EF reduced since August  Cath recently did not show any obstructive disease  There was consideration of takotsubo but the time course of this doesn't seem characteristic. 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 Permanent atrial fibrillation  Rate controlled and anticoagulated  - continue metoprolol and Coumadin     Mitral valve disease s/p prior MV repair  Mild residual MR on recent echo    Dispo: ok for discharge home from CV standpoint on current regimen  FU with Dr Dimitrios Pressley in 1 week    Signed:  Amado Boyer L. Lorel Epley, MD  Interventional Cardiology  10/20/2022

## 2022-10-20 NOTE — NURSE NAVIGATOR
In anticipation of discharge, HF NN scheduled cardiology follow up for patient with Dr. Trang Booth on 10/26/22 at 10:30 AM at [facility].   Details on AVS.

## 2022-10-20 NOTE — PROGRESS NOTES
[facility] Adult  Hospitalist Group                                                                                          Hospitalist Progress Note  Ashwin Justice MD  Answering service: [phone] from in house phone        Date of Service:  10/20/2022  NAME:  Pennie Santana  :  1940  MRN:  [id number]      Admission Summary:   Pennie Santana is a 80 y.o. male with known past medical history of chronic systolic heart failure with left ventricular ejection fraction 35%, chronic atrial fibrillation and atrial flutter on Coumadin at therapeutic range, coronary artery disease history of cardiac stent in , left bundle branch block, dyslipidemia, degenerative joint disease spinal stenosis who presents to ED with complaining of shortness of breath that gradually increased in severity. The patient was evaluated as outpatient and was prescribed Lasix 20 mg p.o. daily but despite of that he continue experience worsening of shortness of breath, decrease physical activity, he noted orthopnea. The patient denied having any fever productive cough, chest pain, but he admits he has some tightness in the chest aggravated by activity. He denied having any nausea vomiting abdominal pain or diarrhea, or sick contact. In the emergency department patient was evaluated, laboratory data was obtained, was significant for elevated BNP as 5000, chest x-ray was significant for some small pleural effusion. The patient recently had cardiac cath in 2022 without significant coronary artery disease. According to patient he did have some work around his house, and was moving boxes. Cardiologist consulted, medicine was consulted for admission and further evaluation. Interval history / Subjective: Follow up acute on chronic systolic HF, SOB  Patient seen examined earlier. He denies shortness of breath, chest pain. He states he is up and walking in the room.   He prefers to return home on discharge, he lives with his wife. We discussed that we are holding discharge today to monitor for kidney function, creatinine has been rising since yesterday. Assessment & Plan:     Acute on chronic systolic HF (heart failure) NYHA II ([facility]) (10/17/2022)  LVEF 20% - 25%  Shortness of breath  Nonischemic cardiomyopathy  Hypotension  --Current GDMT includes Toprol 25 mg, Entresto 24-26 mg. Lasix 40 mg p.o.  Jardiance 10 mg daily. --Low-dose midodrine added to support BP to aid continuation of essential GDMT medications. PAULO creatinine 1.38 today. This is due to hemodynamic instability, patient had episode of hypotension yesterday. --Continue midodrine. --Monitor BMP. If creatinine rises and BP remains soft, cardiac regimen needs to be adjusted        Chronic atrial fibrillation/atrial flutter with left bundle branch block,   ventricular rate controlled  Continue metoprolol and Coumadin, INR in therapeutic range    Coronary artery disease s/p cardiac stent placement in 2000  No chest pain  Continue ASA, statins, will need to have home medications as pt is allergic to Lipitor    Dyslipidemia  Statins, will need to have home medications as pt is allergic to Lipitor     Status post mitral valve repair  Continue coumadin  INR 2.7           Code status: Full code  Prophylaxis: coumadin  Care Plan discussed with: patient, RN  Anticipated Disposition: TBD, when condition of the patient improved and BP is controlled       Hospital Problems  Never Reviewed            Codes Class Noted POA    Acute on chronic systolic HF (heart failure) ([facility] [address] ICD-10-CM: J78.14  ICD-9-CM: 428.23  10/17/2022 Unknown         Review of Systems:   A comprehensive review of systems was negative except for that written in the HPI. Vital Signs:    Last 24hrs VS reviewed since prior progress note.  Most recent are:  Visit Vitals  BP (!) 113/57   Pulse 81   Temp 97.7 °F (36.5 °C)   Resp 14   Ht 6' (1.829 m)   Wt 87.6 kg (193 lb 2 oz)   SpO2 97%   BMI 26.19 kg/m²         Intake/Output Summary (Last 24 hours) at 10/20/2022 4262  Last data filed at 10/20/2022 1603  Gross per 24 hour   Intake 715 ml   Output 1775 ml   Net -1060 ml          Physical Examination:     I had a face to face encounter with this patient and independently examined them on 10/20/2022 as outlined below:          Constitutional:  No acute distress, cooperative, pleasant, + weak   ENT:  Oral mucosa moist, oropharynx benign. Resp:  Coarse bilaterally. No wheezing/rhonchi/rales. No accessory muscle use. CV:  IRIR, Afib/Aflutter, normal rate, no gallops, rubs    GI:  Soft, non distended, non tender. normoactive bowel sounds, no hepatosplenomegaly     Musculoskeletal:  No edema, warm, 2+ pulses throughout    Neurologic:  Moves all extremities. AAOx3, CN II-XII reviewed            Data Review:    Review and/or order of clinical lab test    ECHO:    Left Ventricle: Severely reduced left ventricular systolic function with a visually estimated EF of 20 - 25%. Left ventricle is moderately dilated. Normal wall thickness. Severe global hypokinesis present. Aortic Valve: Mild sclerosis of the aortic valve cusp. Mild regurgitation. Mitral Valve: Valve repaired. Mild regurgitation. Left Atrium: Left atrium is mildly dilated. Aorta: Normal sized ascending aorta. Mildly dilated aortic root. Ao Root diameter is 4.3 cm. Contrast used: Definity.     Labs:     Recent Labs     10/19/22  0433 10/18/22  0429   WBC 7.2 6.3   HGB 14.7 13.4   HCT 44.0 40.0    166       Recent Labs     10/20/22  1034 10/19/22  0433 10/18/22  0429   * 135* 141   K 4.9 3.8 3.8    102 107   CO2 22 24 23   BUN 33* 29* 25*   CREA 1.38* 1.34* 1.03   * 95 109*   CA 8.6 8.8 8.9   MG  --   --  2.3       Recent Labs     10/19/22  0433 10/18/22  0429   ALT 18 19   AP 68 62   TBILI 0.5 0.4   TP 6.9 6.6   ALB 3.4* 3.6   GLOB 3.5 3.0       Recent Labs     10/20/22  0518 10/19/22  0433 10/18/22  0429   INR 2.5* 2.7* 3.1*   PTP 24.3* 26.9* 29.8*        No results for input(s): FE, TIBC, PSAT, FERR in the last 72 hours. No results found for: FOL, RBCF   No results for input(s): PH, PCO2, PO2 in the last 72 hours. No results for input(s): CPK, CKNDX, TROIQ in the last 72 hours.     No lab exists for component: CPKMB  No results found for: CHOL, CHOLX, CHLST, CHOLV, HDL, HDLP, LDL, LDLC, DLDLP, TGLX, TRIGL, TRIGP, CHHD, CHHDX  No results found for: [facility]  Lab Results   Component Value Date/Time    Color YELLOW/STRAW 08/18/2014 10:38 AM    Appearance CLEAR 08/18/2014 10:38 AM    Specific gravity 1.017 08/18/2014 10:38 AM    pH (UA) 6.0 08/18/2014 10:38 AM    Protein NEGATIVE 08/18/2014 10:38 AM    Glucose NEGATIVE 08/18/2014 10:38 AM    Ketone NEGATIVE 08/18/2014 10:38 AM    Bilirubin NEGATIVE 08/18/2014 10:38 AM    Urobilinogen 0.2 08/18/2014 10:38 AM    Nitrites NEGATIVE 08/18/2014 10:38 AM    Leukocyte Esterase NEGATIVE 08/18/2014 10:38 AM    Epithelial cells FEW 08/18/2014 10:38 AM    Bacteria NEGATIVE 08/18/2014 10:38 AM    WBC 0-4 08/18/2014 10:38 AM    RBC 0-5 08/18/2014 10:38 AM         Medications Reviewed:     Current Facility-Administered Medications   Medication Dose Route Frequency    finasteride (PROSCAR) tablet 5 mg  5 mg Oral DAILY    furosemide (LASIX) tablet 40 mg  40 mg Oral DAILY    [Held by provider] tamsulosin (FLOMAX) capsule 0.4 mg  0.4 mg Oral DAILY    midodrine (PROAMATINE) tablet 2.5 mg  2.5 mg Oral TID WITH MEALS    empagliflozin (JARDIANCE) tablet 10 mg  10 mg Oral DAILY    sodium chloride (NS) flush 5-40 mL  5-40 mL IntraVENous Q8H    sodium chloride (NS) flush 5-40 mL  5-40 mL IntraVENous PRN    acetaminophen (TYLENOL) tablet 650 mg  650 mg Oral Q6H PRN    Or    acetaminophen (TYLENOL) suppository 650 mg  650 mg Rectal Q6H PRN    polyethylene glycol (MIRALAX) packet 17 g  17 g Oral DAILY PRN    ondansetron (ZOFRAN ODT) tablet 4 mg  4 mg Oral Q8H PRN Or    ondansetron (ZOFRAN) injection 4 mg  4 mg IntraVENous Q6H PRN    metoprolol succinate (TOPROL-XL) XL tablet 25 mg  25 mg Oral QHS    aspirin delayed-release tablet 81 mg  81 mg Oral DAILY    sacubitriL-valsartan (ENTRESTO) 24-26 mg tablet 1 Tablet  1 Tablet Oral Q12H    Warfarin - pharmacy to dose   Other Rx Dosing/Monitoring     ______________________________________________________________________  EXPECTED LENGTH OF STAY: 3d 19h  ACTUAL LENGTH OF STAY:          3                 Parish Zuleta MD

## 2022-10-20 NOTE — PROGRESS NOTES
Pharmacist Note - Warfarin Dosing  Consult provided for this 82 y.o.male to manage warfarin for Atrial Fibrillation    INR Goal: 2 - 3    Home regimen/ tablet size: 7.5 mg S/S/W and 5 mg M/T/Th/Fri    Drugs that may increase INR: None  Drugs that may decrease INR: None  Other current anticoagulants/ drugs that may increase bleeding risk: Aspirin  Risk factors: Age > 65  Daily INR ordered: YES    Recent Labs     10/20/22  0518 10/19/22  0433 10/18/22  0429 10/17/22  1354   HGB  --  14.7 13.4 13.4   INR 2.5* 2.7* 3.1* 2.9*     Date               INR                  Dose  10/17  2.9   5 mg   10/18  3.1   4 mg   10/19              2.7                   5 mg  10/20              2.5                   5 mg                                                                               Assessment/ Plan: Will order warfarin 5 mg PO x 1 dose. Pharmacy will continue to monitor daily and adjust therapy as indicated. Please contact the pharmacist at  for outpatient recommendations if needed.

## 2022-10-20 NOTE — PROGRESS NOTES
Bedside shift change report given to Justin Martinez, RN and Jeniffer Reynolds RN (oncoming nurse) by Darryl Zazueta RN (offgoing nurse). Report included the following information SBAR. Per MD orthostatics performed on R arm at;    1317: Lying BP - 96/51   MAP - 61    1319: Sitting BP - 111/60   MAP - 76    1321: Standing BP - 107/56   MAP - 71     Patient requested to eat his lunch before orthostatics on the L arm. Orhtostatics L arm:     4265: Lying BP - 114/58   MAP - 72    1607: Sitting BP - 103/85   MAP - 90    1612: Standing BP - 113/57   MAP - 73    1930: Bedside shift change report given to JULIO CESAR Subramanian (oncoming nurse) by Justin Martinez RN and Jeniffer Reynolds RN (offgoing nurse). Report included the following information SBAR, Intake/Output, MAR, and Recent Results.

## 2022-10-20 NOTE — PROGRESS NOTES
Transitions of Care Plan  RUR: 7% - low  Clinical Update: stable for discharge  Consults: None  Baseline: cane for ambulation; resides w wife  Barriers to Discharge: none  Disposition: home with wife  Estimated Discharge Date: 10/21/22  SMAART-E Discharge    CM udpated during IDR - possible discharge this afternoon pending orthostatics and labs. Noted HH PT recommended. After review of case, patient politely declined home health services. Wife will transport home once discharged. CM updated by attending MD this afternoon - discharge held due to worsening kidney function. Disposition:  Home with family.     Moses Bustos, MPH  Care Manager l [employer]  Available via [address] [place] [address] or

## 2022-10-20 NOTE — PROGRESS NOTES
1930: Bedside shift change report given to Sola Rodriguez  (oncoming nurse) by Sultana Hernandez (offgoing nurse). Report included the following information SBAR, Kardex, Intake/Output, and MAR.

## 2022-10-20 NOTE — PROGRESS NOTES
Problem: Mobility Impaired (Adult and Pediatric)  Goal: *Acute Goals and Plan of Care (Insert Text)  Description: FUNCTIONAL STATUS PRIOR TO ADMISSION: Patient was modified independent using a single point cane for functional mobility. No falls. Driving. HOME SUPPORT PRIOR TO ADMISSION: The patient lived with his wife but did not require assist.    Physical Therapy Goals  Initiated 10/18/2022  1. Patient will move from supine to sit and sit to supine , scoot up and down, and roll side to side in bed with modified independence within 7 day(s). 2.  Patient will transfer from bed to chair and chair to bed with modified independence using the least restrictive device within 7 day(s). 3.  Patient will perform sit to stand with modified independence within 7 day(s). 4.  Patient will ambulate with modified independence for 150 feet with the least restrictive device within 7 day(s). 5.  Patient will ascend/descend 4 stairs with right handrail(s) with modified independence within 7 day(s). Outcome: Progressing Towards Goal   PHYSICAL THERAPY TREATMENT  Patient: Norberto Cevallos (22 y.o. male)  Date: 10/20/2022  Diagnosis: Acute on chronic systolic HF (heart failure) ([facility]) [I50.23] <principal problem not specified>      Precautions: Fall  Chart, physical therapy assessment, plan of care and goals were reviewed. ASSESSMENT  Patient continues with skilled PT services and is progressing towards goals. Patient received in chair, agreeable to therapy. Ambulated unit on room air and stable this session. C/o mild SOB, reviewed pursed lip breathing. -125 with activity. Safe for d/c home. Current Level of Function Impacting Discharge (mobility/balance): SBA-mod I    Other factors to consider for discharge: lives with wife         PLAN :  Patient continues to benefit from skilled intervention to address the above impairments. Continue treatment per established plan of care.   to address goals. Recommendation for discharge: (in order for the patient to meet his/her long term goals)  Physical therapy at least 2 days/week in the home     This discharge recommendation:  Has been made in collaboration with the attending provider and/or case management    IF patient discharges home will need the following DME: patient owns DME required for discharge       SUBJECTIVE:   Patient stated They're saying maybe today.     OBJECTIVE DATA SUMMARY:   Critical Behavior:  Neurologic State: Alert  Orientation Level: Oriented X4  Cognition: Appropriate decision making, Appropriate for age attention/concentration, Appropriate safety awareness  Safety/Judgement: Awareness of environment, Home safety, Insight into deficits  Functional Mobility Training:  Transfers:  Sit to Stand: Modified independent  Stand to Sit: Modified independent  Balance:  Sitting: Intact  Standing: Intact; With support  Ambulation/Gait Training:  Distance (ft): 150 Feet (ft)  Assistive Device: Cane, straight  Ambulation - Level of Assistance: Stand-by assistance  Gait Abnormalities: Decreased step clearance (significant forward flexion)  Base of Support: Narrowed  Speed/Sherron: Pace decreased (<100 feet/min)  Step Length: Right shortened;Left shortened      Activity Tolerance:   Good, SpO2 stable on RA, and observed SOB with activity    After treatment patient left in no apparent distress:   Sitting in chair and Call bell within reach    COMMUNICATION/COLLABORATION:   The patients plan of care was discussed with: Registered nurse.      Jaskaran Middleton, PT, DPT   Time Calculation: 8 mins

## 2022-10-21 LAB
ALBUMIN SERPL-MCNC: 3 G/DL (ref 3.5–5)
ANION GAP SERPL CALC-SCNC: 7 MMOL/L (ref 5–15)
ANION GAP SERPL CALC-SCNC: 9 MMOL/L (ref 5–15)
ATRIAL RATE: 58 BPM
BUN SERPL-MCNC: 30 MG/DL (ref 6–20)
BUN SERPL-MCNC: 30 MG/DL (ref 6–20)
BUN/CREAT SERPL: 27 (ref 12–20)
BUN/CREAT SERPL: 27 (ref 12–20)
CALCIUM SERPL-MCNC: 8.5 MG/DL (ref 8.5–10.1)
CALCIUM SERPL-MCNC: 8.6 MG/DL (ref 8.5–10.1)
CALCULATED R AXIS, ECG10: 20 DEGREES
CALCULATED T AXIS, ECG11: -150 DEGREES
CHLORIDE SERPL-SCNC: 106 MMOL/L (ref 97–108)
CHLORIDE SERPL-SCNC: 106 MMOL/L (ref 97–108)
CO2 SERPL-SCNC: 21 MMOL/L (ref 21–32)
CO2 SERPL-SCNC: 23 MMOL/L (ref 21–32)
CREAT SERPL-MCNC: 1.1 MG/DL (ref 0.7–1.3)
CREAT SERPL-MCNC: 1.1 MG/DL (ref 0.7–1.3)
DIAGNOSIS, 93000: NORMAL
GLUCOSE SERPL-MCNC: 103 MG/DL (ref 65–100)
GLUCOSE SERPL-MCNC: 105 MG/DL (ref 65–100)
INR PPP: 2.3 (ref 0.9–1.1)
P-R INTERVAL, ECG05: 164 MS
PHOSPHATE SERPL-MCNC: 3.2 MG/DL (ref 2.6–4.7)
POTASSIUM SERPL-SCNC: 4.6 MMOL/L (ref 3.5–5.1)
POTASSIUM SERPL-SCNC: 4.7 MMOL/L (ref 3.5–5.1)
PROTHROMBIN TIME: 22.4 SEC (ref 9–11.1)
Q-T INTERVAL, ECG07: 458 MS
QRS DURATION, ECG06: 178 MS
QTC CALCULATION (BEZET), ECG08: 487 MS
SODIUM SERPL-SCNC: 136 MMOL/L (ref 136–145)
SODIUM SERPL-SCNC: 136 MMOL/L (ref 136–145)
VENTRICULAR RATE, ECG03: 68 BPM

## 2022-10-21 PROCEDURE — 65270000046 HC RM TELEMETRY

## 2022-10-21 PROCEDURE — 74011000250 HC RX REV CODE- 250: Performed by: INTERNAL MEDICINE

## 2022-10-21 PROCEDURE — 74011250637 HC RX REV CODE- 250/637: Performed by: INTERNAL MEDICINE

## 2022-10-21 PROCEDURE — 80069 RENAL FUNCTION PANEL: CPT

## 2022-10-21 PROCEDURE — 80048 BASIC METABOLIC PNL TOTAL CA: CPT

## 2022-10-21 PROCEDURE — 74011250637 HC RX REV CODE- 250/637: Performed by: HOSPITALIST

## 2022-10-21 PROCEDURE — 36415 COLL VENOUS BLD VENIPUNCTURE: CPT

## 2022-10-21 PROCEDURE — 93005 ELECTROCARDIOGRAM TRACING: CPT

## 2022-10-21 PROCEDURE — 85610 PROTHROMBIN TIME: CPT

## 2022-10-21 RX ORDER — MIDODRINE HYDROCHLORIDE 5 MG/1
2.5 TABLET ORAL
Status: DISCONTINUED | OUTPATIENT
Start: 2022-10-21 | End: 2022-10-22 | Stop reason: HOSPADM

## 2022-10-21 RX ADMIN — METOPROLOL SUCCINATE 25 MG: 25 TABLET, FILM COATED, EXTENDED RELEASE ORAL at 22:18

## 2022-10-21 RX ADMIN — ASPIRIN 81 MG: 81 TABLET, COATED ORAL at 08:50

## 2022-10-21 RX ADMIN — FINASTERIDE 5 MG: 5 TABLET, FILM COATED ORAL at 08:51

## 2022-10-21 RX ADMIN — FUROSEMIDE 40 MG: 40 TABLET ORAL at 08:50

## 2022-10-21 RX ADMIN — MIDODRINE HYDROCHLORIDE 2.5 MG: 5 TABLET ORAL at 08:52

## 2022-10-21 RX ADMIN — EMPAGLIFLOZIN 10 MG: 10 TABLET, FILM COATED ORAL at 08:53

## 2022-10-21 RX ADMIN — SODIUM CHLORIDE, PRESERVATIVE FREE 10 ML: 5 INJECTION INTRAVENOUS at 22:18

## 2022-10-21 RX ADMIN — WARFARIN SODIUM 6 MG: 5 TABLET ORAL at 17:41

## 2022-10-21 NOTE — PROGRESS NOTES
[facility]                                                                                          Hospitalist Progress Note  Rebecca Beckman MD  Answering service: [phone] OR [phone] from in house phone        Date of Service:  10/21/2022  NAME:  Ayse Hanna  :  1940  MRN:  [id number]      Admission Summary:   Ayse Hanna is a 80 y.o. male with known past medical history of chronic systolic heart failure with left ventricular ejection fraction 35%, chronic atrial fibrillation and atrial flutter on Coumadin at therapeutic range, coronary artery disease history of cardiac stent in , left bundle branch block, dyslipidemia, degenerative joint disease spinal stenosis who presents to ED with complaining of shortness of breath that gradually increased in severity. The patient was evaluated as outpatient and was prescribed Lasix 20 mg p.o. daily but despite of that he continue experience worsening of shortness of breath, decrease physical activity, he noted orthopnea. The patient denied having any fever productive cough, chest pain, but he admits he has some tightness in the chest aggravated by activity. He denied having any nausea vomiting abdominal pain or diarrhea, or sick contact. In the emergency department patient was evaluated, laboratory data was obtained, was significant for elevated BNP as 5000, chest x-ray was significant for some small pleural effusion. The patient recently had cardiac cath in 2022 without significant coronary artery disease. According to patient he did have some work around his house, and was moving boxes. Cardiologist consulted, medicine was consulted for admission and further evaluation. Interval history / Subjective: Follow up acute on chronic systolic HF, SOB  Noted events, blood pressure remained to be soft. Creatinine improved.    Discussed with cardiologist, decided to hold Kevin Acosta, this can be revised on outpatient follow-up. Assessment & Plan:     Acute on chronic systolic HF (heart failure) NYHA II ([facility]) (10/17/2022)  LVEF 20% - 25%  Shortness of breath  Nonischemic cardiomyopathy  Hypotension  --Current GDMT includes Toprol 25 mg, Entresto 24-26 mg. Lasix 40 mg p.o.  Jardiance 10 mg daily. -- BP remains soft. Discussed with Dr. Zoie Mac, agreed to stop Prince Sauceda, this will be reviewed on outpatient follow-up. We will hold the low-dose midodrine and monitor. If BP remains stable, patient could be discharged tomorrow. PAULO creatinine 1.38, resolved. Entresto stopped. Chronic atrial fibrillation/atrial flutter with left bundle branch block,   ventricular rate controlled  Continue metoprolol and Coumadin, INR in therapeutic range    Coronary artery disease s/p cardiac stent placement in 2000  No chest pain  Continue ASA, statins, will need to have home medications as pt is allergic to Lipitor    Dyslipidemia  Statins, will need to have home medications as pt is allergic to Lipitor     Status post mitral valve repair  Continue coumadin  INR 2.7           Code status: Full code  Prophylaxis: coumadin  Care Plan discussed with: patient, RN  Anticipated Disposition: Prince Sauceda was discontinued today. If blood pressure and creatinine remained stable, he could be discharged tomorrow.  -Patient otherwise cleared for discharge by cardiology, no recommendation for [facility] Problems  Never Reviewed            Codes Class Noted POA    Acute on chronic systolic HF (heart failure) ([facility] [address] ICD-10-CM: I50.23  ICD-9-CM: 428.23  10/17/2022 Unknown         Review of Systems:   A comprehensive review of systems was negative except for that written in the HPI. Vital Signs:    Last 24hrs VS reviewed since prior progress note.  Most recent are:  Visit Vitals  BP (!) 98/41 (BP 1 Location: Right upper arm, BP Patient Position: At rest)   Pulse 61   Temp 97.6 °F (36.4 °C)   Resp 16   Ht 6' (1.829 m)   Wt 88.2 kg (194 lb 7.1 oz)   SpO2 98%   BMI 26.37 kg/m²         Intake/Output Summary (Last 24 hours) at 10/21/2022 1327  Last data filed at 10/21/2022 0800  Gross per 24 hour   Intake 965 ml   Output 1450 ml   Net -485 ml          Physical Examination:     I had a face to face encounter with this patient and independently examined them on 10/21/2022 as outlined below:          Constitutional:  No acute distress, cooperative, pleasant, + weak   ENT:  Oral mucosa moist, oropharynx benign. Resp:  Coarse bilaterally. No wheezing/rhonchi/rales. No accessory muscle use. CV:  IRIR, Afib/Aflutter, normal rate, no gallops, rubs    GI:  Soft, non distended, non tender. normoactive bowel sounds, no hepatosplenomegaly     Musculoskeletal:  No edema, warm, 2+ pulses throughout    Neurologic:  Moves all extremities. AAOx3, CN II-XII reviewed            Data Review:    Review and/or order of clinical lab test    ECHO:    Left Ventricle: Severely reduced left ventricular systolic function with a visually estimated EF of 20 - 25%. Left ventricle is moderately dilated. Normal wall thickness. Severe global hypokinesis present. Aortic Valve: Mild sclerosis of the aortic valve cusp. Mild regurgitation. Mitral Valve: Valve repaired. Mild regurgitation. Left Atrium: Left atrium is mildly dilated. Aorta: Normal sized ascending aorta. Mildly dilated aortic root. Ao Root diameter is 4.3 cm. Contrast used: Definity.     Labs:     Recent Labs     10/19/22  0433   WBC 7.2   HGB 14.7   HCT 44.0          Recent Labs     10/21/22  0433 10/20/22  1034 10/19/22  0433     136 135* 135*   K 4.7  4.6 4.9 3.8     106 104 102   CO2 21  23 22 24   BUN 30*  30* 33* 29*   CREA 1.10  1.10 1.38* 1.34*   *  103* 112* 95   CA 8.6  8.5 8.6 8.8   PHOS 3.2  --   --        Recent Labs     10/21/22  0433 10/19/22  0433   ALT  --  18   AP  --  68   TBILI  --  0.5   TP  --  6.9   ALB 3.0* 3.4*   GLOB --  3.5       Recent Labs     10/21/22  0433 10/20/22  0518 10/19/22  0433   INR 2.3* 2.5* 2.7*   PTP 22.4* 24.3* 26.9*        No results for input(s): FE, TIBC, PSAT, FERR in the last 72 hours. No results found for: FOL, RBCF   No results for input(s): PH, PCO2, PO2 in the last 72 hours. No results for input(s): CPK, CKNDX, TROIQ in the last 72 hours.     No lab exists for component: CPKMB  No results found for: CHOL, CHOLX, CHLST, CHOLV, HDL, HDLP, LDL, LDLC, DLDLP, TGLX, TRIGL, TRIGP, CHHD, CHHDX  No results found for: [facility]  Lab Results   Component Value Date/Time    Color YELLOW/STRAW 08/18/2014 10:38 AM    Appearance CLEAR 08/18/2014 10:38 AM    Specific gravity 1.017 08/18/2014 10:38 AM    pH (UA) 6.0 08/18/2014 10:38 AM    Protein NEGATIVE 08/18/2014 10:38 AM    Glucose NEGATIVE 08/18/2014 10:38 AM    Ketone NEGATIVE 08/18/2014 10:38 AM    Bilirubin NEGATIVE 08/18/2014 10:38 AM    Urobilinogen 0.2 08/18/2014 10:38 AM    Nitrites NEGATIVE 08/18/2014 10:38 AM    Leukocyte Esterase NEGATIVE 08/18/2014 10:38 AM    Epithelial cells FEW 08/18/2014 10:38 AM    Bacteria NEGATIVE 08/18/2014 10:38 AM    WBC 0-4 08/18/2014 10:38 AM    RBC 0-5 08/18/2014 10:38 AM         Medications Reviewed:     Current Facility-Administered Medications   Medication Dose Route Frequency    warfarin (COUMADIN) tablet 6 mg  6 mg Oral ONCE    midodrine (PROAMATINE) tablet 2.5 mg  2.5 mg Oral TID PRN    finasteride (PROSCAR) tablet 5 mg  5 mg Oral DAILY    furosemide (LASIX) tablet 40 mg  40 mg Oral DAILY    [Held by provider] tamsulosin (FLOMAX) capsule 0.4 mg  0.4 mg Oral DAILY    empagliflozin (JARDIANCE) tablet 10 mg  10 mg Oral DAILY    sodium chloride (NS) flush 5-40 mL  5-40 mL IntraVENous Q8H    sodium chloride (NS) flush 5-40 mL  5-40 mL IntraVENous PRN    acetaminophen (TYLENOL) tablet 650 mg  650 mg Oral Q6H PRN    Or    acetaminophen (TYLENOL) suppository 650 mg  650 mg Rectal Q6H PRN    polyethylene glycol (MIRALAX) packet 17 g  17 g Oral DAILY PRN    ondansetron (ZOFRAN ODT) tablet 4 mg  4 mg Oral Q8H PRN    Or    ondansetron (ZOFRAN) injection 4 mg  4 mg IntraVENous Q6H PRN    metoprolol succinate (TOPROL-XL) XL tablet 25 mg  25 mg Oral QHS    aspirin delayed-release tablet 81 mg  81 mg Oral DAILY    [Held by provider] sacubitriL-valsartan (ENTRESTO) 24-26 mg tablet 1 Tablet  1 Tablet Oral Q12H    Warfarin - pharmacy to dose   Other Rx Dosing/Monitoring     ______________________________________________________________________  EXPECTED LENGTH OF STAY: 3d 19h  ACTUAL LENGTH OF STAY:          4                 Deny Fulton MD

## 2022-10-21 NOTE — PROGRESS NOTES
1930: Bedside and Verbal shift change report given to JULIOC ESAR Subramanian (oncoming nurse) by Pablo Lowe RN and Марина Loja RN (offgoing nurse). Report included the following information SBAR, Kardex, Intake/Output, MAR, Recent Results, and Cardiac Rhythm A Fib/Flutter .

## 2022-10-21 NOTE — PROGRESS NOTES
Problem: Falls - Risk of  Goal: *Absence of Falls  Description: Document [place] Fall Risk and appropriate interventions in the flowsheet.   Outcome: Progressing Towards Goal  Note: Fall Risk Interventions:            Medication Interventions: Patient to call before getting OOB, Teach patient to arise slowly         History of Falls Interventions: Bed/chair exit alarm, Consult care management for discharge planning, Door open when patient unattended, Evaluate medications/consider consulting pharmacy, Investigate reason for fall, Room close to nurse's station

## 2022-10-22 VITALS
BODY MASS INDEX: 25.98 KG/M2 | TEMPERATURE: 97.4 F | OXYGEN SATURATION: 95 % | SYSTOLIC BLOOD PRESSURE: 94 MMHG | HEART RATE: 58 BPM | RESPIRATION RATE: 22 BRPM | HEIGHT: 72 IN | DIASTOLIC BLOOD PRESSURE: 45 MMHG | WEIGHT: 191.8 LBS

## 2022-10-22 LAB
ANION GAP SERPL CALC-SCNC: 3 MMOL/L (ref 5–15)
BNP SERPL-MCNC: 2360 PG/ML
BUN SERPL-MCNC: 33 MG/DL (ref 6–20)
BUN/CREAT SERPL: 30 (ref 12–20)
CALCIUM SERPL-MCNC: 8.5 MG/DL (ref 8.5–10.1)
CHLORIDE SERPL-SCNC: 108 MMOL/L (ref 97–108)
CO2 SERPL-SCNC: 24 MMOL/L (ref 21–32)
CREAT SERPL-MCNC: 1.09 MG/DL (ref 0.7–1.3)
GLUCOSE SERPL-MCNC: 99 MG/DL (ref 65–100)
INR PPP: 2.2 (ref 0.9–1.1)
POTASSIUM SERPL-SCNC: 4.5 MMOL/L (ref 3.5–5.1)
PROTHROMBIN TIME: 22.3 SEC (ref 9–11.1)
SODIUM SERPL-SCNC: 135 MMOL/L (ref 136–145)

## 2022-10-22 PROCEDURE — 74011250637 HC RX REV CODE- 250/637: Performed by: INTERNAL MEDICINE

## 2022-10-22 PROCEDURE — 83880 ASSAY OF NATRIURETIC PEPTIDE: CPT

## 2022-10-22 PROCEDURE — 74011000250 HC RX REV CODE- 250: Performed by: INTERNAL MEDICINE

## 2022-10-22 PROCEDURE — 80048 BASIC METABOLIC PNL TOTAL CA: CPT

## 2022-10-22 PROCEDURE — 36415 COLL VENOUS BLD VENIPUNCTURE: CPT

## 2022-10-22 PROCEDURE — 85610 PROTHROMBIN TIME: CPT

## 2022-10-22 RX ORDER — FINASTERIDE 5 MG/1
5 TABLET, FILM COATED ORAL DAILY
Qty: 30 TABLET | Refills: 0 | Status: SHIPPED | OUTPATIENT
Start: 2022-10-23 | End: 2022-11-22

## 2022-10-22 RX ORDER — FUROSEMIDE 40 MG/1
40 TABLET ORAL DAILY
Qty: 30 TABLET | Refills: 0 | Status: SHIPPED | OUTPATIENT
Start: 2022-10-22 | End: 2022-11-21

## 2022-10-22 RX ADMIN — ASPIRIN 81 MG: 81 TABLET, COATED ORAL at 08:51

## 2022-10-22 RX ADMIN — SODIUM CHLORIDE, PRESERVATIVE FREE 10 ML: 5 INJECTION INTRAVENOUS at 14:18

## 2022-10-22 RX ADMIN — FUROSEMIDE 40 MG: 40 TABLET ORAL at 08:51

## 2022-10-22 RX ADMIN — FINASTERIDE 5 MG: 5 TABLET, FILM COATED ORAL at 08:51

## 2022-10-22 RX ADMIN — EMPAGLIFLOZIN 10 MG: 10 TABLET, FILM COATED ORAL at 08:51

## 2022-10-22 NOTE — PROGRESS NOTES
Checked orthostatic vitals during my round this afternoon. Orthostatics negative. Patient asymptomatic. Wife at the bedside, updated. Patient stable for discharge, he will see his cardiologist next week.

## 2022-10-22 NOTE — PROGRESS NOTES
0730: Bedside shift change report given to William Dennis, RN and Kailee Griggs RN (oncoming nurse) by Marilee Kearney RN (offgoing nurse). Report included the following information SBAR, Intake/Output, MAR, and Recent Results. 1300: MD at bedside to obtain orthostatics with nurse. 1457: Reviewed discharge paperwork with patient and wife at bedside. All questions were asked thoroughly and patient was given discharge paperwork as well as education regarding new medications. Additionally, discharge summary and follow up appointment was discussed with patient and family members.

## 2022-10-22 NOTE — PROGRESS NOTES
Problem: Falls - Risk of  Goal: *Absence of Falls  Description: Document [place] Fall Risk and appropriate interventions in the flowsheet.   Outcome: Progressing Towards Goal  Note: Fall Risk Interventions:            Medication Interventions: Evaluate medications/consider consulting pharmacy, Patient to call before getting OOB, Teach patient to arise slowly         History of Falls Interventions: Bed/chair exit alarm, Consult care management for discharge planning, Door open when patient unattended, Evaluate medications/consider consulting pharmacy, Investigate reason for fall, Room close to nurse's station

## 2022-10-22 NOTE — PROGRESS NOTES
0730: Bedside shift change report given to Yolanda Boothe, Jordana (oncoming nurse) by Michael Munoz RN (offgoing nurse). Report included the following information SBAR, MAR, and Recent Results. Charting and patient care of Norberto Cevallos by Husam Frye RN from 0730 to 6479 was supervised and reviewed by this RN.

## 2022-10-22 NOTE — PROGRESS NOTES
1930: Bedside and Verbal shift change report given to JULIO CESAR Subramanian (oncoming nurse) by Arvind Shaw RN (offgoing nurse). Report included the following information SBAR, Kardex, Intake/Output, MAR, Recent Results, and Cardiac Rhythm A Flutter .

## 2022-10-22 NOTE — PROGRESS NOTES
Pharmacist Note - Warfarin Dosing  Consult provided for this 82 y.o.male to manage warfarin for Atrial Fibrillation    INR Goal: 2 - 3    Home regimen/ tablet size: 7.5 mg S/S/W and 5 mg M/T/Th/Fri    Drugs that may increase INR: None  Drugs that may decrease INR: None  Other current anticoagulants/ drugs that may increase bleeding risk: Aspirin  Risk factors: Age > 65  Daily INR ordered: YES    Recent Labs     10/22/22  0436 10/21/22  0433 10/20/22  0518   INR 2.2* 2.3* 2.5*     Date               INR                  Dose  10/17  2.9   5 mg   10/18  3.1   4 mg   10/19              2.7                   5 mg  10/20              2.5                   5 mg  10/21              2.3                   6 mg               10/22              2.2                   6 mg                                                                               Assessment/ Plan: Will order warfarin 6 mg PO x 1 dose. Pharmacy will continue to monitor daily and adjust therapy as indicated. Please contact the pharmacist at  for outpatient recommendations if needed.      Conrado HernandezD  Clinical Pharmacist  [facility] ()

## 2022-10-22 NOTE — PROGRESS NOTES
[facility] [facility] Adult  Hospitalist Group                                                                                          Hospitalist Progress Note  Jayashree Johnson MD  Answering service: [phone] from in house phone        Date of Service:  10/22/2022  NAME:  Jaye Bansal  :  1940  MRN:  [id number]      Admission Summary:   Jaye Bansal is a 80 y.o. male with known past medical history of chronic systolic heart failure with left ventricular ejection fraction 35%, chronic atrial fibrillation and atrial flutter on Coumadin at therapeutic range, coronary artery disease history of cardiac stent in , left bundle branch block, dyslipidemia, degenerative joint disease spinal stenosis who presents to ED with complaining of shortness of breath that gradually increased in severity. The patient was evaluated as outpatient and was prescribed Lasix 20 mg p.o. daily but despite of that he continue experience worsening of shortness of breath, decrease physical activity, he noted orthopnea. The patient denied having any fever productive cough, chest pain, but he admits he has some tightness in the chest aggravated by activity. He denied having any nausea vomiting abdominal pain or diarrhea, or sick contact. In the emergency department patient was evaluated, laboratory data was obtained, was significant for elevated BNP as 5000, chest x-ray was significant for some small pleural effusion. The patient recently had cardiac cath in 2022 without significant coronary artery disease. According to patient he did have some work around his house, and was moving boxes. Cardiologist consulted, medicine was consulted for admission and further evaluation. Interval history / Subjective: Follow up acute on chronic systolic HF, SOB  Patient has no complaints. Blood pressure is much improved.   Asked RN to check orthostatic vitals, if good, patient could be discharged home. Assessment & Plan:     Acute on chronic systolic HF (heart failure) NYHA II ([facility]) (10/17/2022)  LVEF 20% - 25%  Shortness of breath  Nonischemic cardiomyopathy  Hypotension  --Current GDMT includes Toprol 25 mg  Lasix 40 mg p.o.  Jardiance 10 mg daily. -- BP remains soft. Discussed with Dr. Alta Huertas, agreed to stop [facility], this will be reviewed on outpatient follow-up. BP stable off of Entresto and midodrine. --Patient followed by his cardiologist Dr. Ludwin Casas. PAULO creatinine 1.38, resolved. Entresto stopped. Chronic atrial fibrillation/atrial flutter with left bundle branch block,   ventricular rate controlled  Continue metoprolol and Coumadin, INR in therapeutic range    Coronary artery disease s/p cardiac stent placement in 2000  No chest pain  Continue ASA, statins, will need to have home medications as pt is allergic to Lipitor  Patient had recent cardiac catheterization, nonobstructive disease. Dyslipidemia  Statins, will need to have home medications as pt is allergic to Lipitor     Status post mitral valve repair  Continue coumadin  INR therapeutic. Code status: Full code  Prophylaxis: coumadin  Care Plan discussed with: patient, RN  Anticipated Disposition: Entresto started. Off midodrine. BP stable. Patient stable for discharge  -Patient otherwise cleared for discharge by cardiology, no recommendation for [facility] Problems  Never Reviewed            Codes Class Noted POA    Acute on chronic systolic HF (heart failure) [facility]) ICD-10-CM: I50.23  ICD-9-CM: 428.23  10/17/2022 Unknown       Review of Systems:   A comprehensive review of systems was negative except for that written in the HPI. Vital Signs:    Last 24hrs VS reviewed since prior progress note.  Most recent are:  Visit Vitals  BP (!) 129/42 (BP 1 Location: Right upper arm, BP Patient Position: At rest)   Pulse 80   Temp 98 °F (36.7 °C)   Resp 16   Ht 6' (1.829 m)   Wt 87 kg (191 lb 12.8 oz)   SpO2 98%   BMI 26.01 kg/m²         Intake/Output Summary (Last 24 hours) at 10/22/2022 1215  Last data filed at 10/22/2022 0848  Gross per 24 hour   Intake 680 ml   Output 1350 ml   Net -670 ml          Physical Examination:     I had a face to face encounter with this patient and independently examined them on 10/22/2022 as outlined below:          Constitutional:  No acute distress, cooperative, pleasant, + weak   ENT:  Oral mucosa moist, oropharynx benign. Resp:  Coarse bilaterally. No wheezing/rhonchi/rales. No accessory muscle use. CV:  IRIR, Afib/Aflutter, normal rate, no gallops, rubs    GI:  Soft, non distended, non tender. normoactive bowel sounds, no hepatosplenomegaly     Musculoskeletal:  No edema, warm, 2+ pulses throughout    Neurologic:  Moves all extremities. AAOx3, CN II-XII reviewed            Data Review:    Review and/or order of clinical lab test    ECHO:    Left Ventricle: Severely reduced left ventricular systolic function with a visually estimated EF of 20 - 25%. Left ventricle is moderately dilated. Normal wall thickness. Severe global hypokinesis present. Aortic Valve: Mild sclerosis of the aortic valve cusp. Mild regurgitation. Mitral Valve: Valve repaired. Mild regurgitation. Left Atrium: Left atrium is mildly dilated. Aorta: Normal sized ascending aorta. Mildly dilated aortic root. Ao Root diameter is 4.3 cm. Contrast used: Definity. Labs:     No results for input(s): WBC, HGB, HCT, PLT, HGBEXT, HCTEXT, PLTEXT, HGBEXT, HCTEXT, PLTEXT in the last 72 hours.     Recent Labs     10/22/22  0436 10/21/22  0433 10/20/22  1034   * 136  136 135*   K 4.5 4.7  4.6 4.9    106  106 104   CO2 24 21 23 22   BUN 33* 30*  30* 33*   CREA 1.09 1.10  1.10 1.38*   GLU 99 105*  103* 112*   CA 8.5 8.6  8.5 8.6   PHOS  --  3.2  --        Recent Labs     10/21/22  0433   ALB 3.0*       Recent Labs     10/22/22  0436 10/21/22  0433 10/20/22  0518 INR 2.2* 2.3* 2.5*   PTP 22.3* 22.4* 24.3*        No results for input(s): FE, TIBC, PSAT, FERR in the last 72 hours. No results found for: FOL, RBCF   No results for input(s): PH, PCO2, PO2 in the last 72 hours. No results for input(s): CPK, CKNDX, TROIQ in the last 72 hours.     No lab exists for component: CPKMB  No results found for: CHOL, CHOLX, CHLST, CHOLV, HDL, HDLP, LDL, LDLC, DLDLP, TGLX, TRIGL, TRIGP, CHHD, CHHDX  No results found for: [facility]  Lab Results   Component Value Date/Time    Color YELLOW/STRAW 08/18/2014 10:38 AM    Appearance CLEAR 08/18/2014 10:38 AM    Specific gravity 1.017 08/18/2014 10:38 AM    pH (UA) 6.0 08/18/2014 10:38 AM    Protein NEGATIVE 08/18/2014 10:38 AM    Glucose NEGATIVE 08/18/2014 10:38 AM    Ketone NEGATIVE 08/18/2014 10:38 AM    Bilirubin NEGATIVE 08/18/2014 10:38 AM    Urobilinogen 0.2 08/18/2014 10:38 AM    Nitrites NEGATIVE 08/18/2014 10:38 AM    Leukocyte Esterase NEGATIVE 08/18/2014 10:38 AM    Epithelial cells FEW 08/18/2014 10:38 AM    Bacteria NEGATIVE 08/18/2014 10:38 AM    WBC 0-4 08/18/2014 10:38 AM    RBC 0-5 08/18/2014 10:38 AM         Medications Reviewed:     Current Facility-Administered Medications   Medication Dose Route Frequency    midodrine (PROAMATINE) tablet 2.5 mg  2.5 mg Oral TID PRN    finasteride (PROSCAR) tablet 5 mg  5 mg Oral DAILY    furosemide (LASIX) tablet 40 mg  40 mg Oral DAILY    [Held by provider] tamsulosin (FLOMAX) capsule 0.4 mg  0.4 mg Oral DAILY    empagliflozin (JARDIANCE) tablet 10 mg  10 mg Oral DAILY    sodium chloride (NS) flush 5-40 mL  5-40 mL IntraVENous Q8H    sodium chloride (NS) flush 5-40 mL  5-40 mL IntraVENous PRN    acetaminophen (TYLENOL) tablet 650 mg  650 mg Oral Q6H PRN    Or    acetaminophen (TYLENOL) suppository 650 mg  650 mg Rectal Q6H PRN    polyethylene glycol (MIRALAX) packet 17 g  17 g Oral DAILY PRN    ondansetron (ZOFRAN ODT) tablet 4 mg  4 mg Oral Q8H PRN    Or    ondansetron (ZOFRAN) injection 4 mg  4 mg IntraVENous Q6H PRN    metoprolol succinate (TOPROL-XL) XL tablet 25 mg  25 mg Oral QHS    aspirin delayed-release tablet 81 mg  81 mg Oral DAILY    [Held by provider] sacubitriL-valsartan (ENTRESTO) 24-26 mg tablet 1 Tablet  1 Tablet Oral Q12H    Warfarin - pharmacy to dose   Other Rx Dosing/Monitoring     ______________________________________________________________________  EXPECTED LENGTH OF STAY: 3d 19h  ACTUAL LENGTH OF STAY:          5                 Esa Puckett MD

## 2022-10-23 NOTE — DISCHARGE SUMMARY
Physician Discharge Summary     Patient ID:    Rodena Kanner  [id number]  20 y.o.  1940    Admit date: 10/17/2022    Discharge date and time: 10/23/2022    Hospital Diagnoses and Treatment Rendered:   Patient presented to the ED with progressive shortness of breath, inability to lay flat and weight gain. He was admitted for management of acute on chronic heart failure. His PMH is significant for chronic systolic heart failure with EF of 35%, chronic atrial fibrillation on Coumadin, coronary artery disease with PCI in 2000, left bundle branch block, dyslipidemia, DJD, spinal stenosis. Primary admitting diagnoses/acute diagnoses    1. Acute on chronic HFrEF NYHA class IV on admission, NYHA class II on discharge. 2.  PAULO: Resolved  3. Orthostatic hypotension, resolved. Tamsulosin replaced with finasteride       Patient was diuresed. Toprol continued. Jardiance interstitial initiated this admission. Entresto had to be stopped due to symptomatic hypotension. Hypotension and orthostatic drop in BP resolved after discontinuation of Entresto. Discussed with cardiology, no recommendation for WCD. Patient is to see his cardiologist Jason Lanier next Wednesday. Patient's wife states she sees Ep Doc Chan Ash with [facility] and asked if they can see him. I have told them to speak with Dr. Mainor Pruitt when they see her next week for referral if she feels EP eval device placement is needed at this point. Chronic conditions:    CAD, status post PCI in 2000. Recent cardiac catheterization without obstructive CAD. Status post MVR, on warfarin  Chronic atrial fibrillation with LBBB. Rate controlled with beta-blocker. Anticoagulated with warfarin. Dyslipidemia on statins    Disposition: Home. Condition: Stable  Discharge care plan discussed with patient and his wife.       Chronic Diagnoses:    Problem List as of 10/22/2022 Never Reviewed            Codes Class Noted - Resolved    Acute on chronic systolic HF (heart failure) ([facility]) ICD-10-CM: H34.03  ICD-9-CM: 428.23  10/17/2022 - Present        Spinal stenosis of lumbar region with neurogenic claudication ICD-10-CM: M48.062  ICD-9-CM: 724.03  9/4/2014 - Present           Discharge Medications:   Discharge Medication List as of 10/22/2022  2:57 PM        START taking these medications    Details   empagliflozin (JARDIANCE) 10 mg tablet Take 1 Tablet by mouth daily for 30 days. , Normal, Disp-30 Tablet, R-0      finasteride (PROSCAR) 5 mg tablet Take 1 Tablet by mouth daily for 30 days. , Normal, Disp-30 Tablet, R-0      furosemide (LASIX) 40 mg tablet Take 1 Tablet by mouth daily for 30 days. , Normal, Disp-30 Tablet, R-0           CONTINUE these medications which have NOT CHANGED    Details   warfarin (COUMADIN) 5 mg tablet Take 5 mg by mouth daily. Sat sun wed  7.5 mg, Historical Med      metoprolol succinate (TOPROL-XL) 25 mg XL tablet Take 25 mg by mouth daily. , Historical Med      metroNIDAZOLE (METROLOTION) 0.75 % lotion Apply  to affected area two (2) times a day., Historical Med      acetaminophen (TYLENOL) 500 mg tablet Take 1,000 mg by mouth every six (6) hours as needed for Pain., Historical Med      nitroglycerin (NITROSTAT) 0.4 mg SL tablet by SubLINGual route every five (5) minutes as needed for Chest Pain., Historical Med      aspirin 81 mg tablet Take 81 mg by mouth daily. , Historical Med      simvastatin (ZOCOR) 40 mg tablet Take  by mouth daily. , Historical Med           STOP taking these medications       tamsulosin (FLOMAX) 0.4 mg capsule Comments:   Reason for Stopping: Follow up Care: Follow-up Information       Follow up With Specialties Details Why Contact Info Additional Information    [facility] CARDIOPULM REHAB Cardiac Rehabilitation Follow up Call to discuss participation in the outpatient cardiac rehab program (for heart health).  [id number] [facility] in designated visitor/patient parking or use the [employer]. Enter through the main entrance, which is just to the left of the fountain. Please go directly to the Cardiac Rehab office for registration. The office is located on the first floor of the Medical Office Building in [address]. Zoila Bose MD  Follow up on 10/26/2022 Cardiology follow up with Dr. Danielito Woodard on Wednesday, 10/26/22 at 10:30 AM at the [facility] office location. [facility]  [address]  [place], [address]    [phone]     Iván Foster MD Family Medicine   [address] [place] [address] [phone]             1. Iván Foster MD in 1-2 weeks. Please call to set up an appointment shortly after discharge. Diet:  Regular Diet    Disposition:  Home. PATIENT CONDITION AT DISCHARGE:   [facility] home in stable condition      Advanced Directive: Full code    Discharge Exam:  GENERAL:  Alert, oriented, cooperative, no apparent distress  HEENT:  Normocephalic, atraumatic, non icteric sclerae, non pallor conjuctivae, EOMs intact, PERRLA. NECK: Supple, trachea midline, no adenopathy, no thyromegally or tenderness, no carotid bruit and no JVD. LUNGS:   Vesicular breath sounds bilaterally, no added sounds. HEART:  Irregularly irregular, rate controlled  ABDOMEB:   Soft, non-tender. Normoactive bowel sounds. No masses,  No organomegaly. EXTREMETIES:  Atraumatic, acyanotic, no edema  PULSES: 2+ and symmetric all extremities. SKIN:  No rashes or lesions  NEUROLOGY: Alert and oriented to PPT, CNII-XII intact. Motor and sensory exam grossly intact. CONSULTATIONS: IP CONSULT TO CARDIOLOGY    Significant Diagnostic Studies:   No results for input(s): WBC, HGB, HCT, PLT, HGBEXT, HCTEXT, PLTEXT in the last 72 hours.   Recent Labs     10/22/22  0436 10/21/22  0433 10/20/22  1034   * 136  136 135*   K 4.5 4.7  4.6 4.9    106  106 104   CO2 24 21 23 22   BUN 33* 30*  30* 33*   CREA 1.09 1.10  1.10 1.38*   GLU 99 105*  103* 112*   CA 8.5 8.6  8.5 8.6   PHOS  --  3.2  --      Recent Labs     10/21/22  0433   ALB 3.0*     Recent Labs     10/22/22  0436 10/21/22  0433   INR 2.2* 2.3*   PTP 22.3* 22.4*      No results for input(s): FE, TIBC, PSAT, FERR in the last 72 hours. No results for input(s): PH, PCO2, PO2 in the last 72 hours. No results for input(s): CPK, CKMB in the last 72 hours. No lab exists for component: TROPONINI  No components found for: [place]      Greater than 30 minutes were spent with the patient on counseling and coordination of care      Signed:   Jeff Bean MD  10/23/2022  8:41 AM

## 2022-10-24 ENCOUNTER — PATIENT OUTREACH (OUTPATIENT)
Dept: CASE MANAGEMENT | Age: 82
End: 2022-10-24

## 2022-10-24 NOTE — PROGRESS NOTES
Care Transitions Initial Call    Call within 2 business days of discharge: Yes     Patient: Vin Kennedy Patient : 1940 MRN: [id number]    Last Discharge [address]       Date Complaint Diagnosis Description Type Department Provider    10/17/22 Shortness of Breath Orthopnea . .. ED to Hosp-Admission (Discharged) (ADMIT) Wilner Cota MD; Brooklyn Pak,... Was this an external facility discharge? No     Challenges to be reviewed by the provider   Additional needs identified to be addressed with provider: yes    HFrEF- recent new diagnosis. Cardiology consulted- felt to be more idiopathic dilated CM vs. Takotsubo. Echo done. Recent cath 22. Increased furosemide to 40 mg daily. Due to low BP values, entresto initiated but stopped. Changed tamulosin to finestride daily. New: Jardiance 10 mg daily. PMH-chronic AFib.- Continue Toprol XL 25 mg daily. PCP doses warfarin. Has home INR machine- checks values weekly. PMH- s/p Mitral valve repair, previous stent . Clarify with cardiology- continue ASA 81 mg daily. Method of communication with provider : faxed additional records to PCP through [address] fax. Discussed COVID-19 related testing which was not done at this time. Advance Care Planning:   Does patient have an Advance Directive: AMD on file, dated 2014. Inpatient Readmission Risk score: Unplanned Readmit Risk Score: 8.7    Was this a readmission? no     Patients top risk factors for readmission: lack of knowledge about disease and medical condition-     Interventions to address risk factors: Education of patient/family/caregiver/guardian to support self-management- , Assessment and support for treatment adherence and medication management- , and communication with PCP    Care Transition Nurse (CTN) contacted the patient by telephone to perform post hospital discharge assessment. Verified name and  with patient as identifiers. Provided introduction to self, and explanation of the CTN role. CTN reviewed discharge instructions, medical action plan and red flags with  patient and wife  who verbalized understanding. Were discharge instructions available to patient? yes. Reviewed appropriate site of care based on symptoms and resources available to patient including: PCP, Specialist, and CTN . Both were  given an opportunity to ask questions and do not have any further questions or concerns at this time. Both  agree to contact the PCP and/or specialist office for questions related to their healthcare. Heart Failure Note    Do you have a Scale:    yes   How often do you weigh:  agreed to start weighing daily and record values    Daily Weight (document daily weights in flowsheets):    has not weighed since discharge    Amount:  CECILE       Provider Notified:   No     Zone:(Pt Reported)  yellow     EF: 20-25% on 10/18/22  Type of HF:   HFrEF     Cardiac Device present:  none       Heart Failure Medications: Betablocker, Diuretic, Anticoagulant       Medication reconciliation was performed with patient, who verbalizes understanding of administration of home medications. Advised obtaining a 90-day supply of all daily and as-needed medications. Referral to Pharm D needed: no     Home Health/Outpatient orders at discharge: none    Durable Medical Equipment ordered at discharge: None  CTN reached out to CR at [facility], to ensure referral sent to start. Was patient discharged with a pulse oximeter? NA    Discussed follow-up appointments. If no appointment was previously scheduled, appointment scheduling offered: yes. Is follow up appointment scheduled within 7 days of discharge? yes. [facility] follow up appointment(s): No future appointments. Non-[facility] follow up appointment(s): refer to goals section    Plan for follow-up call in 5-7 days based on severity of symptoms and risk factors.   Plan for next call:   Assess current symptoms including daily monitoring items  Attended follow up with cardiology  Plan in place with PCP for follow up  Follow up if CR reached out to contact patient. Discuss current AMD on file. CTN provided contact information for future needs. Goals Addressed                   This Visit's Progress       Heart Failure     Patient verbalizes understanding of self-management goals of living with Congestive Heart Failure        10/24/22- spoke with Mr. Pedro Harris and wife, Letitia Schwab was in background participating in conversation. He said he feels good, has been napping some during day, but also sleeping well at night. Discussion about daily monitoring items:   Weight, explained good routine for weighing. He has been monitoring BP and HR values. He reports the following:  values done approx 2 hours after taking morning meds:    Date:      Left arm-                Right arm  11/23/22- 10:20 am    95/42, HR 48            118/44, HR 58  11/24/22-  10:20 am    113/59, HR 58          117/43, HR 43. CTN asked him to change to taking values prior to taking Toprol XL 25 mg. Monitor for s/sx dizziness, lightheadedness, etc.  Reach out to cardiology for guidance. Teaching done on BB. Monitor for s/sx HF- denies current signs of fluid retention, prior to admission- had fluid in abdomen. Currently has SOB with ambulation to BR, recovers quickly. Reviewed low NA diet, explained relationship of high NA intake and fluid retention leading to dehydration with increased work on heart and kidneys. Stay under 2000 mg daily; avoid large single intakes. In the setting of eating low NA, drink fluids to maintain hydration. Aim for 64 ounces each day. Teaching done on Jardiance- monitor for dehydration, urine output, etc.    Pt will remain out of the hospital or ER for remainder of Bundle period. [facility]            Post Hospitalization     Attends follow-up appointments as directed.         10/24/22-   PCP- Dr. Ant Soto- in [place], 6 mo scheduled in Dec 2022. He will call office to move up if recommended by PCP.     Cardiology- Dr. Kayode Alcocer- 10/26 at 10:30 am                            [facility]

## 2022-11-11 ENCOUNTER — PATIENT OUTREACH (OUTPATIENT)
Dept: CASE MANAGEMENT | Age: 82
End: 2022-11-11

## 2022-11-11 NOTE — PROGRESS NOTES
Care Transitions Follow Up Call    Challenges to be reviewed by the provider   Additional needs identified to be addressed with provider: yes    BP values reported: 100-110's/50-60's, HR 60-63. Continues on toprol 25 mg daily. Weight steady at 190 lbs. SOB-ORTIZ with min activity. Continues on lasix 40 mg daily. No edema. Dry mouth, with low energy. Not sleeping well 2/2 pain in right shoulder. Possible also AFib. Seeing EP on - 11/10/22-he accompanied wife to her appt with same EP- Dr. Araceli Ayala- brief discussion about him following up on . CTN working with Mr. Jamaica Davis to maintain hydration- up to 48-64 ounces per day- now drinking 1-2 glasses. Following low NA diet. Method of communication with provider : none- see above about accompanying to wife's appointment with EP provider on 11/10. Care Transition Nurse (CTN) contacted the patient by telephone to follow up after admission on 10/17/22. Verified name and  with patient as identifiers. Addressed changes since last contact: refer to above yellow box and goals section. CTN reviewed current symptoms, discharge instructions, medical action plan and red flags with patient and discussed any barriers to care and/or understanding of plan of care after discharge. Discussed appropriate site of care based on symptoms and resources available to patient including: Specialist. The patient agrees to contact the PCP office for questions related to their healthcare. [facility] follow up appointment(s):   Future Appointments   Date Time Provider [place]   2022 10:00 AM [facility] [address]     Non-[facility] follow up appointment(s): refer to goals section. CTN provided contact information for future needs. Plan for follow-up call in 7-10 days based on severity of symptoms and risk factors. Plan for next call:   Assess current symptoms and daily monitoring items.    Progress with maintaining hydration-effect on symptoms. Assess if AMD in place, if so, secure copy for EMR. If not, assist with completing. Goals Addressed                   This Visit's Progress       Heart Failure     Patient verbalizes understanding of self-management goals of living with Congestive Heart Failure        11/11/22- spoke with Mr. Ryland Ferris. He said he is feeling okay- tired. Followed up with cardiology, Dr. Katie Agustin on 10/26- she did not make any changes. He is seeing EP- Dr. Radhika Cash on 12/1 (he saw him yesterday with wife at her appointment). He reports the following:    weight steady around 190 lbs- reports urine flow still intermittent- best when sitting. Has ORTIZ-SOB with min-mod activity- bathing, dressing- walking a couple times to BR. Scheduled for intake with CR at [facility] on 12/5/22. He is monitoring BP and HR BID- reports the following values:    yesterday am- 117/51, HR 60, pm- 102/57, HR 61 and this morning- 112/68, HR 63. Denies edema, moving bowels with help of laxative. mouth feels dry, especially at night. He reports monitoring NA- eating low NA. Drinks 1-2 glasses per day- explained goal was 48-64 ounces of hydrating liquids a day- aim for 48- see if symptoms improve- BP values improve, less tired and dry, improved-more regular bowel function. [facility]     10/24/22- spoke with Mr. Ryland Ferris and wife, Libertad Coto was in background participating in conversation. He said he feels good, has been napping some during day, but also sleeping well at night. Discussion about daily monitoring items:   Weight, explained good routine for weighing. He has been monitoring BP and HR values. He reports the following:  values done approx 2 hours after taking morning meds:    Date:      Left arm-                Right arm  11/23/22- 10:20 am    95/42, HR 48            118/44, HR 58  11/24/22-  10:20 am    113/59, HR 58          117/43, HR 43.     CTN asked him to change to taking values prior to taking Toprol XL 25 mg. Monitor for s/sx dizziness, lightheadedness, etc.  Reach out to cardiology for guidance. Teaching done on BB. Monitor for s/sx HF- denies current signs of fluid retention, prior to admission- had fluid in abdomen. Currently has SOB with ambulation to BR, recovers quickly. Reviewed low NA diet, explained relationship of high NA intake and fluid retention leading to dehydration with increased work on heart and kidneys. Stay under 2000 mg daily; avoid large single intakes. In the setting of eating low NA, drink fluids to maintain hydration. Aim for 64 ounces each day. Teaching done on Jardiance- monitor for dehydration, urine output, etc.    Pt will remain out of the hospital or ER for remainder of Bundle period. [facility]            Post Hospitalization     Attends follow-up appointments as directed. 11/11/22-   PCP- Dr. Roro Berger- has been communicating INR values. No change in warfarin dose, INR range has been between 2-3. Has appt in December. Cardiology- primary- Dr. Sivakumar Chiu- asked to see EP. EP-  Dr. Rodrigo Ardon- 12/1. [facility]     10/24/22-   PCP- Dr. Roro Berger- in [place], 6 mo scheduled in Dec 2022. He will call office to move up if recommended by PCP. Cardiology- Dr. Sivakumar Chiu- 10/26 at 10:30 am    attended-llc.                          [employer]

## 2022-11-20 DIAGNOSIS — I50.22 CHRONIC SYSTOLIC HEART FAILURE (HCC): ICD-10-CM

## 2022-11-23 ENCOUNTER — PATIENT OUTREACH (OUTPATIENT)
Dept: CASE MANAGEMENT | Age: 82
End: 2022-11-23

## 2022-12-05 ENCOUNTER — APPOINTMENT (OUTPATIENT)
Dept: CARDIAC REHAB | Age: 82
End: 2022-12-05

## 2022-12-13 ENCOUNTER — PATIENT OUTREACH (OUTPATIENT)
Dept: CASE MANAGEMENT | Age: 82
End: 2022-12-13

## 2022-12-13 NOTE — PROGRESS NOTES
Care Transitions Follow Up Call    Challenges to be reviewed by the provider   Additional needs identified to be addressed with provider: no    CTN was not able to reach patient to complete follow up outreach assessment. Left VM asking for return call. Method of communication with provider : none    Care Transition Nurse (CTN) contacted the patient by telephone to follow up after admission on 10/17/22. Left VM asking for return call. Addressed changes since last contact: refer to above yellow box and goals section    [facility] follow up appointment(s): No future appointments. Non-[facility] follow up appointment(s): refer to goals section    CTN provided contact information for future needs. Plan for follow-up call in 10-14 days based on severity of symptoms and risk factors. Plan for next call:   Assess current symptoms including daily monitoring items  Follow up on plan with EP. Discuss AMD on file, may need to be \"altered/completed\". Goals Addressed                   This Visit's Progress       Heart Failure     Patient verbalizes understanding of self-management goals of living with Congestive Heart Failure        12/13/22- Left Vm asking for return call. Review of EMR shows: CR documented that he is to [facility] pacemaker placed at the end of December\", CR deferred for now. [facility]     11/23/22- spoke with Mr. More Leggett- he is feeling about same, saw PCP yesterday. His initial HR values were elevated, he said provider checked via pulse OX and had come down. He reports the following values-  at home  11/23/22- am-   102/62, HR 61.      11/22/22-  133/65, HR40 (first and only time in 40's) earlier it was 119/76, HR 62  11/21/22- 108/56, HR 52. Weight steady around 190 lbs. He has increased fluid intake now about 32 ounces, asked him to increase to 48 per day. Reminded him about NA- relationship with fluid retention. Explained-reminded him that dehydration is a trigger for AFib.     He said he is sleeping well, eating well and moving bowels. Family is coming to his home for Thanksgiving. [facility] [place] [place]     11/11/22- spoke with Mr. Richard Jin. He said he is feeling okay- tired. Followed up with cardiology, Dr. Hugh Gabriel on 10/26- she did not make any changes. He is seeing EP- Dr. Kalin Ca on 12/1 (he saw him yesterday with wife at her appointment). He reports the following:    weight steady around 190 lbs- reports urine flow still intermittent- best when sitting. Has ORTIZ-SOB with min-mod activity- bathing, dressing- walking a couple times to BR. Scheduled for intake with CR at [facility] on 12/5/22. He is monitoring BP and HR BID- reports the following values:    yesterday am- 117/51, HR 60, pm- 102/57, HR 61 and this morning- 112/68, HR 63. Denies edema, moving bowels with help of laxative. mouth feels dry, especially at night. He reports monitoring NA- eating low NA. Drinks 1-2 glasses per day- explained goal was 48-64 ounces of hydrating liquids a day- aim for 48- see if symptoms improve- BP values improve, less tired and dry, improved-more regular bowel function. [facility] [place] [place]     10/24/22- spoke with Mr. Richard Jin and wife, Sacha Fraire was in background participating in conversation. He said he feels good, has been napping some during day, but also sleeping well at night. Discussion about daily monitoring items:   Weight, explained good routine for weighing. He has been monitoring BP and HR values. He reports the following:  values done approx 2 hours after taking morning meds:    Date:      Left arm-                Right arm  11/23/22- 10:20 am    95/42, HR 48            118/44, HR 58  11/24/22-  10:20 am    113/59, HR 58          117/43, HR 43. CTN asked him to change to taking values prior to taking Toprol XL 25 mg. Monitor for s/sx dizziness, lightheadedness, etc.  Reach out to cardiology for guidance. Teaching done on BB.     Monitor for s/sx HF- denies current signs of fluid retention, prior to admission- had fluid in abdomen. Currently has SOB with ambulation to BR, recovers quickly. Reviewed low NA diet, explained relationship of high NA intake and fluid retention leading to dehydration with increased work on heart and kidneys. Stay under 2000 mg daily; avoid large single intakes. In the setting of eating low NA, drink fluids to maintain hydration. Aim for 64 ounces each day. Teaching done on Jardiance- monitor for dehydration, urine output, etc.    Pt will remain out of the hospital or ER for remainder of Bundle period.      [facility]

## 2022-12-30 ENCOUNTER — PATIENT OUTREACH (OUTPATIENT)
Dept: CASE MANAGEMENT | Age: 82
End: 2022-12-30

## 2022-12-30 NOTE — PROGRESS NOTES
Care Transitions Follow Up Call    Challenges to be reviewed by the provider   Additional needs identified to be addressed with provider: no  none           Method of communication with provider : none    Care Transition Nurse (CTN) contacted the patient by telephone to follow up after admission on 10/17/22. Verified name and  with patient as identifiers. Addressed changes since last contact: refer to goals section    CTN reviewed current symptoms, discharge instructions, medical action plan and red flags with patient and discussed any barriers to care and/or understanding of plan of care after discharge. Discussed appropriate site of care based on symptoms and resources available to patient including: PCP, Specialist, After hours contact number- , and CTN . The patient agrees to contact the PCP and/or specialist office for questions related to their healthcare. [facility] follow up appointment(s): No future appointments. Non-[facility] follow up appointment(s): refer to goals section. CTN provided contact information for future needs. Plan for follow-up call in 7-10 days based on severity of symptoms and risk factors. Plan for next call:   Assess current symptoms including daily monitoring items  Attended follow up with EP, update on possible diaphragm stimulation. INR values. Goals Addressed                   This Visit's Progress       Heart Failure     Patient verbalizes understanding of self-management goals of living with Congestive Heart Failure        22- spoke with Mr. Juli Melgoza, he had left VM on 22- relaying his Pacemaker and ICD was done on 22, doing well. He has reached out to EP to let them know he is having sensation on left axillary to front abdominal area- possible stimulation- they moved up appt from 1/10 to 1/3. Reviewed post device recovery, doing well. Sleeping on back. Has surgical glue in place.     CTN will update CR at [facility], planned start may need to be pushed back. VSS- reports weigh consistent at 190 lbs. BP values have been good- 110-120's. Denies SOB-ORTIZ, has not been doing very much post device placement. No edema. Self testing for INR- has been fluctuating more this week. Reviewed possible sources- no abx post device placement. Asked him to look at beverages he is drinking, often contain hidden Vit K. He has been drinking more to stay hydrated. [facility]     12/13/22- Left  asking for return call. Review of EMR shows: CR documented that he is to [facility] pacemaker placed at the end of December\", CR deferred for now. [place]     11/23/22- spoke with Mr. Erendira Le- he is feeling about same, saw PCP yesterday. His initial HR values were elevated, he said provider checked via pulse OX and had come down. He reports the following values-  at home  11/23/22- am-   102/62, HR 61.      11/22/22-  133/65, HR40 (first and only time in 40's) earlier it was 119/76, HR 62  11/21/22- 108/56, HR 52. Weight steady around 190 lbs. He has increased fluid intake now about 32 ounces, asked him to increase to 48 per day. Reminded him about NA- relationship with fluid retention. Explained-reminded him that dehydration is a trigger for AFib. He said he is sleeping well, eating well and moving bowels. Family is coming to his home for Thanksgiving. [place]     11/11/22- spoke with Mr. Erendira Le. He said he is feeling okay- tired. Followed up with cardiology, Dr. Casie Bush on 10/26- she did not make any changes. He is seeing EP- Dr. Thomas Reynolds on 12/1 (he saw him yesterday with wife at her appointment). He reports the following:    weight steady around 190 lbs- reports urine flow still intermittent- best when sitting. Has ORTIZ-SOB with min-mod activity- bathing, dressing- walking a couple times to BR. Scheduled for intake with CR at [facility] on 12/5/22.     He is monitoring BP and HR BID- reports the following values:    yesterday am- 117/51, HR 60, pm- 102/57, HR 61 and this morning- 112/68, HR 63. Denies edema, moving bowels with help of laxative. mouth feels dry, especially at night. He reports monitoring NA- eating low NA. Drinks 1-2 glasses per day- explained goal was 48-64 ounces of hydrating liquids a day- aim for 48- see if symptoms improve- BP values improve, less tired and dry, improved-more regular bowel function. [facility]     10/24/22- spoke with Mr. Blake Thorne and wife, Ihsan Graff was in background participating in conversation. He said he feels good, has been napping some during day, but also sleeping well at night. Discussion about daily monitoring items:   Weight, explained good routine for weighing. He has been monitoring BP and HR values. He reports the following:  values done approx 2 hours after taking morning meds:    Date:      Left arm-                Right arm  11/23/22- 10:20 am    95/42, HR 48            118/44, HR 58  11/24/22-  10:20 am    113/59, HR 58          117/43, HR 43. CTN asked him to change to taking values prior to taking Toprol XL 25 mg. Monitor for s/sx dizziness, lightheadedness, etc.  Reach out to cardiology for guidance. Teaching done on BB. Monitor for s/sx HF- denies current signs of fluid retention, prior to admission- had fluid in abdomen. Currently has SOB with ambulation to BR, recovers quickly. Reviewed low NA diet, explained relationship of high NA intake and fluid retention leading to dehydration with increased work on heart and kidneys. Stay under 2000 mg daily; avoid large single intakes. In the setting of eating low NA, drink fluids to maintain hydration. Aim for 64 ounces each day. Teaching done on Jardiance- monitor for dehydration, urine output, etc.    Pt will remain out of the hospital or ER for remainder of Bundle period. [facility]            Post Hospitalization     Attends follow-up appointments as directed.         12/30/22-   PCP -  Dr. Edison Covington- TBD  Cardiology- Primary- Dr. Marizol Yepez- Dr. Enrique Long- 1/3/22 wound check and new PPM/ICD check. Moved up from 1/10/23- possible diaphragm stim happening. [facility]     11/11/22-   PCP- Dr. Sharona Juarez- has been communicating INR values. No change in warfarin dose, INR range has been between 2-3. Has appt in December. Saw on 11/22. Cardiology- primary- Dr. Kai Horton- asked to see EP. EP-  Dr. Enrique Long- 12/1. [facility]     10/24/22-   PCP- Dr. Sharona Juarez- in [facility], 6 mo scheduled in Dec 2022. He will call office to move up if recommended by PCP. Cardiology- Dr. Kai Horton- 10/26 at 10:30 am    attended-[employer]

## 2023-01-10 ENCOUNTER — PATIENT OUTREACH (OUTPATIENT)
Dept: CASE MANAGEMENT | Age: 83
End: 2023-01-10

## 2023-01-10 NOTE — PROGRESS NOTES
Care Transitions Follow Up Call    Challenges to be reviewed by the provider   Additional needs identified to be addressed with provider: no    CTN was not able to reach patient to complete follow up outreach assessment. Method of communication with provider : none    Care Transition Nurse (CTN) contacted the patient by telephone to follow up after admission on 10/17/22. Left VM asking for return call. Addressed changes since last contact: refer to above yellow box. [facility] follow up appointment(s): No future appointments. Non-[facility] follow up appointment(s): refer to goals section    CTN provided contact information for future needs. Plan for follow-up call in 7-10 days based on severity of symptoms and risk factors. Plan for next call:   Complete follow up outreach assessment. Resolve bundle episode, no referral to ACM per workflow. Goals Addressed                   This Visit's Progress       Heart Failure     Patient verbalizes understanding of self-management goals of living with Congestive Heart Failure        1/10/23- Left VM asking for return call. [facility]     12/30/22- spoke with Mr. Bora Magaña, he had left VM on 12/25/22- relaying his Pacemaker and ICD was done on 12/22/22, doing well. He has reached out to EP to let them know he is having sensation on left axillary to front abdominal area- possible stimulation- they moved up appt from 1/10 to 1/3. Reviewed post device recovery, doing well. Sleeping on back. Has surgical glue in place. CTN will update CR at [facility], planned start may need to be pushed back. VSS- reports weigh consistent at 190 lbs. BP values have been good- 110-120's. Denies SOB-ORTIZ, has not been doing very much post device placement. No edema. Self testing for INR- has been fluctuating more this week. Reviewed possible sources- no abx post device placement. Asked him to look at beverages he is drinking, often contain hidden Vit K.   He has been drinking more to stay hydrated. [facility]     12/13/22- Left  asking for return call. Review of EMR shows: CR documented that he is to [facility] [place] pacemaker placed at the end of December\", CR deferred for now. [facility]     11/23/22- spoke with Mr. Moise Nyhan- he is feeling about same, saw PCP yesterday. His initial HR values were elevated, he said provider checked via pulse OX and had come down. He reports the following values-  at home  11/23/22- am-   102/62, HR 61.      11/22/22-  133/65, HR40 (first and only time in 40's) earlier it was 119/76, HR 62  11/21/22- 108/56, HR 52. Weight steady around 190 lbs. He has increased fluid intake now about 32 ounces, asked him to increase to 48 per day. Reminded him about NA- relationship with fluid retention. Explained-reminded him that dehydration is a trigger for AFib. He said he is sleeping well, eating well and moving bowels. Family is coming to his home for Thanksgiving. [facility]     11/11/22- spoke with Mr. Moise Nyhan. He said he is feeling okay- tired. Followed up with cardiology, Dr. Maday Barlow on 10/26- she did not make any changes. He is seeing EP- Dr. Doug Brown on 12/1 (he saw him yesterday with wife at her appointment). He reports the following:    weight steady around 190 lbs- reports urine flow still intermittent- best when sitting. Has ORTIZ-SOB with min-mod activity- bathing, dressing- walking a couple times to BR. Scheduled for intake with CR at [facility] on 12/5/22. He is monitoring BP and HR BID- reports the following values:    yesterday am- 117/51, HR 60, pm- 102/57, HR 61 and this morning- 112/68, HR 63. Denies edema, moving bowels with help of laxative. mouth feels dry, especially at night. He reports monitoring NA- eating low NA. Drinks 1-2 glasses per day- explained goal was 48-64 ounces of hydrating liquids a day- aim for 48- see if symptoms improve- BP values improve, less tired and dry, improved-more regular bowel function. [facility]     10/24/22- spoke with Mr. Daly Vaughn and wife, Alisha Campos was in background participating in conversation. He said he feels good, has been napping some during day, but also sleeping well at night. Discussion about daily monitoring items:   Weight, explained good routine for weighing. He has been monitoring BP and HR values. He reports the following:  values done approx 2 hours after taking morning meds:    Date:      Left arm-                Right arm  11/23/22- 10:20 am    95/42, HR 48            118/44, HR 58  11/24/22-  10:20 am    113/59, HR 58          117/43, HR 43. CTN asked him to change to taking values prior to taking Toprol XL 25 mg. Monitor for s/sx dizziness, lightheadedness, etc.  Reach out to cardiology for guidance. Teaching done on BB. Monitor for s/sx HF- denies current signs of fluid retention, prior to admission- had fluid in abdomen. Currently has SOB with ambulation to , recovers quickly. Reviewed low NA diet, explained relationship of high NA intake and fluid retention leading to dehydration with increased work on heart and kidneys. Stay under 2000 mg daily; avoid large single intakes. In the setting of eating low NA, drink fluids to maintain hydration. Aim for 64 ounces each day. Teaching done on Jardiance- monitor for dehydration, urine output, etc.    Pt will remain out of the hospital or ER for remainder of Bundle period.      [facility]

## 2023-01-20 ENCOUNTER — PATIENT OUTREACH (OUTPATIENT)
Dept: CASE MANAGEMENT | Age: 83
End: 2023-01-20

## 2023-01-20 NOTE — PROGRESS NOTES
Patient has graduated from the Bundle program on 1/20/23. Patient/family has the ability to self-manage at this time Care management goals have been completed. Patient was not referred to the [facility] team for further management. Goals Addressed                   This Visit's Progress       Heart Failure     COMPLETED: Patient verbalizes understanding of self-management goals of living with Congestive Heart Failure        1/20/23- spoke with Mr. Erna Landa- he said he feels well, Dr. Bo Henderson staff was able to stop stim from his device. He is going to start CR at [facility] location on 2/1. Explained end of MIGUE bundle period. He will reach out if he needs anything, but reminded him that CR staff can assist as well. [facility]     1/10/23- Left  asking for return call. [facility]     12/30/22- spoke with Mr. Erna Landa, he had left VM on 12/25/22- relaying his Pacemaker and ICD was done on 12/22/22, doing well. He has reached out to EP to let them know he is having sensation on left axillary to front abdominal area- possible stimulation- they moved up appt from 1/10 to 1/3. Reviewed post device recovery, doing well. Sleeping on back. Has surgical glue in place. CTN will update CR at [facility], planned start may need to be pushed back. VSS- reports weigh consistent at 190 lbs. BP values have been good- 110-120's. Denies SOB-ORTIZ, has not been doing very much post device placement. No edema. Self testing for INR- has been fluctuating more this week. Reviewed possible sources- no abx post device placement. Asked him to look at beverages he is drinking, often contain hidden Vit K. He has been drinking more to stay hydrated. [facility]     12/13/22- Left Vm asking for return call. Review of EMR shows: CR documented that he is to [facility] pacemaker placed at the end of December\", CR deferred for now. [facility]     11/23/22- spoke with Mr. Erna Landa- he is feeling about same, saw PCP yesterday.  His initial HR values were elevated, he said provider checked via pulse OX and had come down. He reports the following values-  at home  11/23/22- am-   102/62, HR 61.      11/22/22-  133/65, HR40 (first and only time in 40's) earlier it was 119/76, HR 62  11/21/22- 108/56, HR 52. Weight steady around 190 lbs. He has increased fluid intake now about 32 ounces, asked him to increase to 48 per day. Reminded him about NA- relationship with fluid retention. Explained-reminded him that dehydration is a trigger for AFib. He said he is sleeping well, eating well and moving bowels. Family is coming to his home for Thanksgiving. [facility]     11/11/22- spoke with Mr. Elizabeth Taylor. He said he is feeling okay- tired. Followed up with cardiology, Dr. Wu Fernandez on 10/26- she did not make any changes. He is seeing EP- Dr. Almanzar Led on 12/1 (he saw him yesterday with wife at her appointment). He reports the following:    weight steady around 190 lbs- reports urine flow still intermittent- best when sitting. Has ORTIZ-SOB with min-mod activity- bathing, dressing- walking a couple times to BR. Scheduled for intake with CR at [facility] on 12/5/22. He is monitoring BP and HR BID- reports the following values:    yesterday am- 117/51, HR 60, pm- 102/57, HR 61 and this morning- 112/68, HR 63. Denies edema, moving bowels with help of laxative. mouth feels dry, especially at night. He reports monitoring NA- eating low NA. Drinks 1-2 glasses per day- explained goal was 48-64 ounces of hydrating liquids a day- aim for 48- see if symptoms improve- BP values improve, less tired and dry, improved-more regular bowel function. [facility]     10/24/22- spoke with Mr. Elizabeth Taylor and wife, Laura Hinds was in background participating in conversation. He said he feels good, has been napping some during day, but also sleeping well at night. Discussion about daily monitoring items:   Weight, explained good routine for weighing.    He has been monitoring BP and HR values. He reports the following:  values done approx 2 hours after taking morning meds:    Date:      Left arm-                Right arm  11/23/22- 10:20 am    95/42, HR 48            118/44, HR 58  11/24/22-  10:20 am    113/59, HR 58          117/43, HR 43. CTN asked him to change to taking values prior to taking Toprol XL 25 mg. Monitor for s/sx dizziness, lightheadedness, etc.  Reach out to cardiology for guidance. Teaching done on BB. Monitor for s/sx HF- denies current signs of fluid retention, prior to admission- had fluid in abdomen. Currently has SOB with ambulation to BR, recovers quickly. Reviewed low NA diet, explained relationship of high NA intake and fluid retention leading to dehydration with increased work on heart and kidneys. Stay under 2000 mg daily; avoid large single intakes. In the setting of eating low NA, drink fluids to maintain hydration. Aim for 64 ounces each day. Teaching done on Jardiance- monitor for dehydration, urine output, etc.    Pt will remain out of the hospital or ER for remainder of Bundle period. [facility]            Post Hospitalization     COMPLETED: Attends follow-up appointments as directed. 12/30/22-   PCP -  Dr. Jacinta Hardy- D  Cardiology- Primary- Dr. Alia Jiang- Dr. Mariann Lloyd- 1/3/22 wound check and new PPM/ICD check. Moved up from 1/10/23- possible diaphragm stim happening. [facility]     11/11/22-   PCP- Dr. Stacy Martinez- has been communicating INR values. No change in warfarin dose, INR range has been between 2-3. Has appt in December. Saw on 11/22. Cardiology- primary- Dr. Kevin Horn- asked to see EP. EP-  Dr. Mariann Lloyd- 12/1. [facility]     10/24/22-   PCP- Dr. Stacy Martinez- in [place], 6 mo scheduled in Dec 2022. He will call office to move up if recommended by PCP. Cardiology- Dr. Kevin Horn- 10/26 at 10:30 am    attended-[facility].                          [facility]               Patient has Care Transition Nurse's contact information for any further questions, concerns, or needs. Patients upcoming visits:  No future appointments.

## 2023-02-01 ENCOUNTER — HOSPITAL ENCOUNTER (OUTPATIENT)
Dept: CARDIAC REHAB | Age: 83
Discharge: HOME | End: 2023-02-01
Payer: MEDICARE

## 2023-02-01 VITALS — WEIGHT: 198 LBS | BODY MASS INDEX: 26.82 KG/M2 | HEIGHT: 72 IN

## 2023-02-01 PROCEDURE — 93798 PHYS/QHP OP CAR RHAB W/ECG: CPT

## 2023-02-01 RX ORDER — GABAPENTIN 100 MG/1
100 CAPSULE ORAL
COMMUNITY

## 2023-02-01 RX ORDER — FUROSEMIDE 40 MG/1
40 TABLET ORAL DAILY
COMMUNITY

## 2023-02-01 RX ORDER — FINASTERIDE 5 MG/1
5 TABLET, FILM COATED ORAL DAILY
COMMUNITY

## 2023-02-01 NOTE — CARDIO/PULMONARY
[facility] Cardiopulmonary Rehab Orientation:  Met with [place] :40, for cardiac rehab orientation and exercise tolerance test today. He is a 80year-old patient of Dr Bryn Kaminski, with primary diagnosis of Heart Failure. Cardiac hx also includes: A Fib, MV repair in , BiV ICD in 22, stent in ,  with LVEF 20-25%. Cardiac risk factors include: HTN, HLD, age, gender, and family hx. BMI 26.8. Pt quit smoking cigarettes in . CAD risk factors were reviewed with patient. Pt resides with wife and granddaughter in [place]. He retired last year from . He has raised 3 children. 2 sons and a daughter who passed away 17 years ago from depression. Depression score PHQ9 is 1 and this is considered a low score . The result was discussed with patient, who affirms score to be accurate. Patient denied chest pain or SOB during 6 minute exercise tolerance test on  [employer] at 33 RPM with peak HR 92, peak /50, and RPE 11. Cardiac rhythm was AV paced, V paced with occ PVCs  . Limitations to exercise are primarily related to  deconditioning. Pt has not been exercising at home. He enjoys being outdoors and used to hunt. Pt was given the Cardiac Rehab manual and an exercise plan was developed. Pt will attend cardiac rehab 2-3 times per week.

## 2023-02-02 ENCOUNTER — HOSPITAL ENCOUNTER (OUTPATIENT)
Dept: CARDIAC REHAB | Age: 83
Discharge: HOME | End: 2023-02-02
Payer: MEDICARE

## 2023-02-02 VITALS — WEIGHT: 195.2 LBS | BODY MASS INDEX: 26.47 KG/M2

## 2023-02-02 PROCEDURE — 93798 PHYS/QHP OP CAR RHAB W/ECG: CPT

## 2023-02-06 ENCOUNTER — TRANSCRIBE ORDER (OUTPATIENT)
Dept: CARDIAC REHAB | Age: 83
End: 2023-02-06

## 2023-02-06 ENCOUNTER — HOSPITAL ENCOUNTER (OUTPATIENT)
Dept: CARDIAC REHAB | Age: 83
Discharge: HOME | End: 2023-02-06
Payer: MEDICARE

## 2023-02-06 VITALS — WEIGHT: 195.2 LBS | BODY MASS INDEX: 26.47 KG/M2

## 2023-02-06 DIAGNOSIS — I50.22 CHRONIC SYSTOLIC HEART FAILURE (HCC): Primary | ICD-10-CM

## 2023-02-06 PROCEDURE — 93798 PHYS/QHP OP CAR RHAB W/ECG: CPT

## 2023-02-08 ENCOUNTER — HOSPITAL ENCOUNTER (OUTPATIENT)
Dept: CARDIAC REHAB | Age: 83
Discharge: HOME | End: 2023-02-08
Payer: MEDICARE

## 2023-02-08 VITALS — WEIGHT: 195.2 LBS | BODY MASS INDEX: 26.47 KG/M2

## 2023-02-08 PROCEDURE — 93798 PHYS/QHP OP CAR RHAB W/ECG: CPT

## 2023-02-10 ENCOUNTER — HOSPITAL ENCOUNTER (OUTPATIENT)
Dept: CARDIAC REHAB | Age: 83
Discharge: HOME | End: 2023-02-10
Payer: MEDICARE

## 2023-02-10 VITALS — BODY MASS INDEX: 26.8 KG/M2 | WEIGHT: 197.6 LBS

## 2023-02-10 PROCEDURE — 93798 PHYS/QHP OP CAR RHAB W/ECG: CPT

## 2023-02-13 ENCOUNTER — HOSPITAL ENCOUNTER (OUTPATIENT)
Dept: CARDIAC REHAB | Age: 83
Discharge: HOME | End: 2023-02-13
Payer: MEDICARE

## 2023-02-13 VITALS — BODY MASS INDEX: 26.77 KG/M2 | WEIGHT: 197.4 LBS

## 2023-02-13 PROCEDURE — 93798 PHYS/QHP OP CAR RHAB W/ECG: CPT

## 2023-02-15 ENCOUNTER — HOSPITAL ENCOUNTER (OUTPATIENT)
Dept: CARDIAC REHAB | Age: 83
Discharge: HOME | End: 2023-02-15
Payer: MEDICARE

## 2023-02-15 VITALS — BODY MASS INDEX: 26.53 KG/M2 | WEIGHT: 195.6 LBS

## 2023-02-15 PROCEDURE — 93798 PHYS/QHP OP CAR RHAB W/ECG: CPT

## 2023-02-17 ENCOUNTER — HOSPITAL ENCOUNTER (OUTPATIENT)
Dept: CARDIAC REHAB | Age: 83
Discharge: HOME | End: 2023-02-17
Payer: MEDICARE

## 2023-02-17 VITALS — WEIGHT: 194.4 LBS | BODY MASS INDEX: 26.37 KG/M2

## 2023-02-17 PROCEDURE — 93798 PHYS/QHP OP CAR RHAB W/ECG: CPT

## 2023-02-20 ENCOUNTER — HOSPITAL ENCOUNTER (OUTPATIENT)
Dept: CARDIAC REHAB | Age: 83
Discharge: HOME | End: 2023-02-20
Payer: MEDICARE

## 2023-02-20 VITALS — WEIGHT: 194.8 LBS | BODY MASS INDEX: 26.42 KG/M2

## 2023-02-20 PROCEDURE — 93798 PHYS/QHP OP CAR RHAB W/ECG: CPT

## 2023-02-22 ENCOUNTER — HOSPITAL ENCOUNTER (OUTPATIENT)
Dept: CARDIAC REHAB | Age: 83
Discharge: HOME | End: 2023-02-22
Payer: MEDICARE

## 2023-02-22 VITALS — WEIGHT: 192.4 LBS | BODY MASS INDEX: 26.09 KG/M2

## 2023-02-22 PROCEDURE — 93798 PHYS/QHP OP CAR RHAB W/ECG: CPT

## 2023-02-23 ENCOUNTER — APPOINTMENT (OUTPATIENT)
Dept: CARDIAC REHAB | Age: 83
End: 2023-02-23
Payer: MEDICARE

## 2023-02-24 ENCOUNTER — HOSPITAL ENCOUNTER (OUTPATIENT)
Dept: CARDIAC REHAB | Age: 83
Discharge: HOME | End: 2023-02-24
Payer: MEDICARE

## 2023-02-24 VITALS — WEIGHT: 195.2 LBS | BODY MASS INDEX: 26.47 KG/M2

## 2023-02-24 PROCEDURE — 93798 PHYS/QHP OP CAR RHAB W/ECG: CPT

## 2023-02-27 ENCOUNTER — HOSPITAL ENCOUNTER (OUTPATIENT)
Dept: CARDIAC REHAB | Age: 83
Discharge: HOME | End: 2023-02-27
Payer: MEDICARE

## 2023-02-27 VITALS — WEIGHT: 193 LBS | BODY MASS INDEX: 26.18 KG/M2

## 2023-02-27 PROCEDURE — 93798 PHYS/QHP OP CAR RHAB W/ECG: CPT

## 2023-02-28 ENCOUNTER — TRANSCRIBE ORDER (OUTPATIENT)
Dept: SCHEDULING | Age: 83
End: 2023-02-28

## 2023-02-28 DIAGNOSIS — M47.816 LUMBAR SPONDYLOSIS: Primary | ICD-10-CM

## 2023-02-28 DIAGNOSIS — M51.36 DDD (DEGENERATIVE DISC DISEASE), LUMBAR: ICD-10-CM

## 2023-02-28 DIAGNOSIS — M48.062 SPINAL STENOSIS, LUMBAR REGION, WITH NEUROGENIC CLAUDICATION: ICD-10-CM

## 2023-03-01 ENCOUNTER — HOSPITAL ENCOUNTER (OUTPATIENT)
Dept: CARDIAC REHAB | Age: 83
Discharge: HOME | End: 2023-03-01
Payer: MEDICARE

## 2023-03-01 VITALS — BODY MASS INDEX: 26.45 KG/M2 | WEIGHT: 195 LBS

## 2023-03-01 PROCEDURE — 93797 PHYS/QHP OP CAR RHAB WO ECG: CPT

## 2023-03-01 PROCEDURE — 93798 PHYS/QHP OP CAR RHAB W/ECG: CPT

## 2023-03-03 ENCOUNTER — HOSPITAL ENCOUNTER (OUTPATIENT)
Dept: CARDIAC REHAB | Age: 83
Discharge: HOME | End: 2023-03-03
Payer: MEDICARE

## 2023-03-03 VITALS — WEIGHT: 194.8 LBS | BODY MASS INDEX: 26.42 KG/M2

## 2023-03-03 PROCEDURE — 93798 PHYS/QHP OP CAR RHAB W/ECG: CPT

## 2023-03-06 ENCOUNTER — HOSPITAL ENCOUNTER (OUTPATIENT)
Dept: CARDIAC REHAB | Age: 83
Discharge: HOME | End: 2023-03-06
Payer: MEDICARE

## 2023-03-06 VITALS — BODY MASS INDEX: 26.23 KG/M2 | WEIGHT: 193.4 LBS

## 2023-03-06 PROCEDURE — 93798 PHYS/QHP OP CAR RHAB W/ECG: CPT

## 2023-03-08 ENCOUNTER — HOSPITAL ENCOUNTER (OUTPATIENT)
Dept: CARDIAC REHAB | Age: 83
Discharge: HOME | End: 2023-03-08
Payer: MEDICARE

## 2023-03-08 VITALS — WEIGHT: 194.2 LBS | BODY MASS INDEX: 26.34 KG/M2

## 2023-03-08 PROCEDURE — 93798 PHYS/QHP OP CAR RHAB W/ECG: CPT

## 2023-03-08 PROCEDURE — 93797 PHYS/QHP OP CAR RHAB WO ECG: CPT

## 2023-03-09 ENCOUNTER — HOSPITAL ENCOUNTER (OUTPATIENT)
Dept: CARDIAC REHAB | Age: 83
Discharge: HOME | End: 2023-03-09
Payer: MEDICARE

## 2023-03-09 VITALS — BODY MASS INDEX: 26.39 KG/M2 | WEIGHT: 194.6 LBS

## 2023-03-09 PROCEDURE — 93798 PHYS/QHP OP CAR RHAB W/ECG: CPT

## 2023-03-09 PROCEDURE — 93797 PHYS/QHP OP CAR RHAB WO ECG: CPT

## 2023-03-13 ENCOUNTER — HOSPITAL ENCOUNTER (OUTPATIENT)
Dept: CARDIAC REHAB | Age: 83
Discharge: HOME | End: 2023-03-13
Payer: MEDICARE

## 2023-03-13 VITALS — BODY MASS INDEX: 26.37 KG/M2 | WEIGHT: 194.4 LBS

## 2023-03-13 PROCEDURE — 93798 PHYS/QHP OP CAR RHAB W/ECG: CPT

## 2023-03-16 ENCOUNTER — HOSPITAL ENCOUNTER (OUTPATIENT)
Dept: CARDIAC REHAB | Age: 83
Discharge: HOME | End: 2023-03-16
Payer: MEDICARE

## 2023-03-16 VITALS — BODY MASS INDEX: 26.39 KG/M2 | WEIGHT: 194.6 LBS

## 2023-03-16 PROCEDURE — 93797 PHYS/QHP OP CAR RHAB WO ECG: CPT

## 2023-03-16 PROCEDURE — 93798 PHYS/QHP OP CAR RHAB W/ECG: CPT

## 2023-03-20 ENCOUNTER — HOSPITAL ENCOUNTER (OUTPATIENT)
Dept: CARDIAC REHAB | Age: 83
Discharge: HOME | End: 2023-03-20
Payer: MEDICARE

## 2023-03-20 VITALS — BODY MASS INDEX: 26.37 KG/M2 | WEIGHT: 194.4 LBS

## 2023-03-20 PROCEDURE — 93798 PHYS/QHP OP CAR RHAB W/ECG: CPT

## 2023-03-22 ENCOUNTER — HOSPITAL ENCOUNTER (OUTPATIENT)
Dept: CARDIAC REHAB | Age: 83
Discharge: HOME | End: 2023-03-22
Payer: MEDICARE

## 2023-03-22 VITALS — WEIGHT: 193.8 LBS | BODY MASS INDEX: 26.28 KG/M2

## 2023-03-22 PROCEDURE — 93798 PHYS/QHP OP CAR RHAB W/ECG: CPT

## 2023-03-27 ENCOUNTER — HOSPITAL ENCOUNTER (OUTPATIENT)
Dept: CARDIAC REHAB | Age: 83
Discharge: HOME | End: 2023-03-27
Payer: MEDICARE

## 2023-03-27 VITALS — WEIGHT: 194.6 LBS | BODY MASS INDEX: 26.39 KG/M2

## 2023-03-27 PROCEDURE — 93798 PHYS/QHP OP CAR RHAB W/ECG: CPT

## 2023-03-27 PROCEDURE — 93797 PHYS/QHP OP CAR RHAB WO ECG: CPT

## 2023-03-27 NOTE — CARDIO/PULMONARY
Cardiac Rehab Nutrition Assessment - 1:1 Evaluation         NAME: Ashkan Segovia : 1940 AGE: 80 y.o. GENDER: male  CARDIAC REHAB ADMITTING DIAGNOSIS: HF    PROBLEM LIST:  Patient Active Problem List   Diagnosis Code    Spinal stenosis of lumbar region with neurogenic claudication M48.062    Acute on chronic systolic HF (heart failure) ([facility] [address] I50.23     S/p AVR in 2017  Pacemaker placed in Dec 2022. LABS:   Lab Results   Component Value Date/Time    Hemoglobin A1c 5.7 2014 10:02 AM     No results found for: CHOL, CHOLPOCT, CHOLX, CHLST, CHOLV, HDL, HDLPOC, HDLP, LDL, LDLCPOC, LDLC, DLDLP, VLDLC, VLDL, TGLX, TRIGL, TRIGP, TGLPOCT, CHHD, CHHDX      MEDICATIONS/SUPPLEMENTS:   Prior to Admission medications    Medication Sig Start Date End Date Taking? Authorizing Provider   finasteride (PROSCAR) 5 mg tablet Take 5 mg by mouth daily. Provider, Historical   furosemide (Lasix) 40 mg tablet Take 40 mg by mouth daily. Pt has started taking every other day, depending on weight    Provider, Historical   empagliflozin (JARDIANCE) 10 mg tablet Take 10 mg by mouth daily. Provider, Historical   gabapentin (NEURONTIN) 100 mg capsule Take 100 mg by mouth nightly. Provider, Historical   warfarin (COUMADIN) 5 mg tablet Take 5 mg by mouth daily. Sat sun wed  7.5 mg    Provider, Historical   metoprolol succinate (TOPROL-XL) 25 mg XL tablet Take 25 mg by mouth daily. Provider, Historical   metroNIDAZOLE (METROLOTION) 0.75 % lotion Apply  to affected area two (2) times a day. Provider, Historical   acetaminophen (TYLENOL) 500 mg tablet Take 1,000 mg by mouth every six (6) hours as needed for Pain. Provider, Historical   nitroglycerin (NITROSTAT) 0.4 mg SL tablet by SubLINGual route every five (5) minutes as needed for Chest Pain. Provider, Historical   aspirin 81 mg tablet Take 81 mg by mouth daily. 5/3/11   Provider, Historical   simvastatin (ZOCOR) 40 mg tablet Take  by mouth daily.  5/3/11 Provider, Historical         ANTHROPOMETRICS:    Ht Readings from Last 1 Encounters:   02/01/23 6' (1.829 m)      Wt Readings from Last 10 Encounters:   03/22/23 87.9 kg (193 lb 12.8 oz)   03/20/23 88.2 kg (194 lb 6.4 oz)   03/16/23 88.3 kg (194 lb 9.6 oz)   03/13/23 88.2 kg (194 lb 6.4 oz)   03/09/23 88.3 kg (194 lb 9.6 oz)   03/08/23 88.1 kg (194 lb 3.2 oz)   03/06/23 87.7 kg (193 lb 6.4 oz)   03/03/23 88.4 kg (194 lb 12.8 oz)   03/01/23 88.5 kg (195 lb)   02/27/23 87.5 kg (193 lb)       BMI: 26.28 kg/M2 Category: overweight   Waist (measured at intake): 45 inches    Reported Wt Hx: lost 11# of fluid in the hospital, now stabled out. UBW = 201#, now stable around 190#. Reported Diet Hx: NKFA. Does not avoid foods unless he doesn't like it (such as plain noodles). Eats very little rice; enjoys most fruits and vegetables and normally has one or the other with meals. Reports he has cut back on portions, only eats one plate of food vs going back for more. Based on diet recall, suspect patient is consuming >2000 mg sodium daily and this was relayed to him. Discussed cutting back on portions of processed meats or choosing low sodium alternatives when possible. Encouraged addition of fruits or low vitamin K vegetables with most meals as well for balance.      Rate Your Plate Score: 50  (Score 58-72: Making many healthy choices; 41-57: Some choices need improving 24-40: many choices need improving)    24 HOUR DIET RECALL  Breakfast Packaged oatmeal, toast and orange juice; 2 eggs 4-5x/week with estrada, sausage, corned beef hash (on avg 3 pieces of estrada, 1-2 pieces of sausage), biscuits   Lunch Skips 3-4 days/week; sandwich with deli ham   Dinner spaghetti with salad; meat (chicken, fish - tuna or salmon), potatoes, small bowl of greens or cabbage; hamburger (maybe once/month)   Snacks Seldom; peanut butter crackers NABs; fruit salad (dessert)   Beverages Water, gatorade; soda once in awhile; coffee     [place] Sindi Perales     Environmental/Social:  and lives with wife and granddaughter. Retired ; was working up until [employer] 2022. Former smoker; quit in 1972. Back pain limits ability to walk and stand up straight. Wife does the grocery shopping and cooking. Estimated Energy Needs: 1941 (using [address] with AF 1.2)    NUTRITION INTERVENTION:  Nutrition 60 minute one-on-one education & goal setting with Jami Crespo    Reviewed with Jami Crespo relevant labs compared to ideals. Reviewed weight history and patient's verbalized weight goal as well as any real or perceived barriers to obtaining the goal. Collaborated with patient to set a specific short and long term weight goal.     Reviewed Rate Your Plate and conducted a verbal diet recall. Assessed for environmental, financial, psychosocial, physical and comorbidities that may impact the food and eating patterns / behaviors of Jami Zak    Collaborated with patient to set specific nutrient goals as well as specific food / behavior changes that will help patient meet the overall goal of following a heart healthy eating pattern (using guidelines as set forth by the American Heart Association and modeled after healthful eating patterns as recognized by the USDA Dietary Guidelines such as DASH, Mediterranean or plant-based). Briefly reviewed with Jami Crespo the nutrition information in the Cardiac Rehab patient education book and encouraged Jami Crespo to read thoroughly, ask questions as needed, and use for future reference for heart healthy nutrition information. Jami Crespo is scheduled to participate in Cardiac Rehab group nutrition classes.         PATIENT GOALS:    Weight Goals: maint [de-identified] yo)    Nutrition Goals:  Daily Recommendations:  Calories: 8524-0284 /day  (for weight maint)  Saturated Fat: no more than 12 g/day  Trans Fat: 0 g/day  Sodium: no more than 6296-3758 mg/day  Fruit: 2 cups / day  Vegetables: 2-3 cups/day    Other:  - read and compare food labels for sodium and limit to </= 5% per serving  - reduce portion of processed meats, particularly at breakfast (1-2 slices at the most); choose low sodium for sandwiches or replace with peanut butter and jelly      Keeping a food diary was recommended. Questions addressed. Follow-up plans discussed. Arin Dan verbalized understanding.             Sonia Jewell RD

## 2023-03-29 ENCOUNTER — HOSPITAL ENCOUNTER (OUTPATIENT)
Dept: CARDIAC REHAB | Age: 83
Discharge: HOME | End: 2023-03-29
Payer: MEDICARE

## 2023-03-29 VITALS — BODY MASS INDEX: 26.15 KG/M2 | WEIGHT: 192.8 LBS

## 2023-03-29 PROCEDURE — 93798 PHYS/QHP OP CAR RHAB W/ECG: CPT

## 2023-04-03 ENCOUNTER — HOSPITAL ENCOUNTER (OUTPATIENT)
Dept: CARDIAC REHAB | Age: 83
End: 2023-04-03
Payer: MEDICARE

## 2023-04-03 PROCEDURE — 93798 PHYS/QHP OP CAR RHAB W/ECG: CPT

## 2023-04-05 ENCOUNTER — HOSPITAL ENCOUNTER (OUTPATIENT)
Dept: CARDIAC REHAB | Age: 83
End: 2023-04-05
Payer: MEDICARE

## 2023-04-05 ENCOUNTER — HOSPITAL ENCOUNTER (OUTPATIENT)
Dept: MRI IMAGING | Age: 83
End: 2023-04-05
Payer: MEDICARE

## 2023-04-05 PROCEDURE — 93798 PHYS/QHP OP CAR RHAB W/ECG: CPT

## 2023-04-05 PROCEDURE — 72148 MRI LUMBAR SPINE W/O DYE: CPT

## 2023-04-14 ENCOUNTER — HOSPITAL ENCOUNTER (OUTPATIENT)
Dept: CARDIAC REHAB | Age: 83
Discharge: HOME | End: 2023-04-14
Payer: MEDICARE

## 2023-04-14 VITALS — WEIGHT: 193.8 LBS | BODY MASS INDEX: 26.28 KG/M2

## 2023-04-14 PROCEDURE — 93798 PHYS/QHP OP CAR RHAB W/ECG: CPT

## 2023-04-17 ENCOUNTER — HOSPITAL ENCOUNTER (OUTPATIENT)
Dept: CARDIAC REHAB | Age: 83
Discharge: HOME | End: 2023-04-17
Payer: MEDICARE

## 2023-04-17 VITALS — BODY MASS INDEX: 26.28 KG/M2 | WEIGHT: 193.8 LBS

## 2023-04-17 PROCEDURE — 93798 PHYS/QHP OP CAR RHAB W/ECG: CPT

## 2023-04-19 ENCOUNTER — HOSPITAL ENCOUNTER (OUTPATIENT)
Dept: CARDIAC REHAB | Age: 83
Discharge: HOME | End: 2023-04-19
Payer: MEDICARE

## 2023-04-19 VITALS — BODY MASS INDEX: 26.42 KG/M2 | WEIGHT: 194.8 LBS

## 2023-04-19 PROCEDURE — 93798 PHYS/QHP OP CAR RHAB W/ECG: CPT

## 2023-04-20 NOTE — PROGRESS NOTES
Pharmacist Note - Warfarin Dosing  Consult provided for this 82 y.o.male to manage warfarin for Atrial Fibrillation    INR Goal: 2 - 3    Home regimen/ tablet size: 7.5 mg S/S/W and 5 mg M/T/Th/Fri    Drugs that may increase INR: None  Drugs that may decrease INR: None  Other current anticoagulants/ drugs that may increase bleeding risk: Aspirin  Risk factors: Age > 65  Daily INR ordered: YES    Recent Labs     10/21/22  0433 10/20/22  0518 10/19/22  0433   HGB  --   --  14.7   INR 2.3* 2.5* 2.7*     Date               INR                  Dose  10/17  2.9   5 mg   10/18  3.1   4 mg   10/19              2.7                   5 mg  10/20              2.5                   5 mg  10/21              2.3                   6 mg                                                                                            Assessment/ Plan: Will order warfarin 6 mg PO x 1 dose. Pharmacy will continue to monitor daily and adjust therapy as indicated. Please contact the pharmacist at  for outpatient recommendations if needed. Admission

## 2023-04-21 ENCOUNTER — HOSPITAL ENCOUNTER (OUTPATIENT)
Dept: CARDIAC REHAB | Age: 83
Discharge: HOME | End: 2023-04-21
Payer: MEDICARE

## 2023-04-21 VITALS — WEIGHT: 193.6 LBS | BODY MASS INDEX: 26.26 KG/M2

## 2023-04-21 PROCEDURE — 93798 PHYS/QHP OP CAR RHAB W/ECG: CPT

## 2023-04-22 DIAGNOSIS — M51.36 DDD (DEGENERATIVE DISC DISEASE), LUMBAR: ICD-10-CM

## 2023-04-22 DIAGNOSIS — M47.816 LUMBAR SPONDYLOSIS: Primary | ICD-10-CM

## 2023-04-22 DIAGNOSIS — M48.062 SPINAL STENOSIS, LUMBAR REGION, WITH NEUROGENIC CLAUDICATION: ICD-10-CM

## 2023-04-24 ENCOUNTER — HOSPITAL ENCOUNTER (OUTPATIENT)
Dept: CARDIAC REHAB | Age: 83
Discharge: HOME | End: 2023-04-24
Payer: MEDICARE

## 2023-04-24 VITALS — BODY MASS INDEX: 26.34 KG/M2 | WEIGHT: 194.2 LBS

## 2023-04-24 PROCEDURE — 93798 PHYS/QHP OP CAR RHAB W/ECG: CPT

## 2023-04-24 NOTE — CARDIO/PULMONARY
Maico Mcmahon  Completed phase II cardiac rehab and attended 36 sessions from 2/1/23 to 4/24/23. Maico Mcmahon is interested in maintaining optimal health and will work with Dr. Nkechi Avalos. Maico Mcmahon has improved his endurance and stamina through regular exercise, lost 5lbs . Blood pressure is 130/56 and is WNL. He has also improved his nutrition, [employer] and depression scores and these were reviewed with patient. Maico Mcmahon plans to continue exercising at home, at a gym, and has set revised goals that include using cardio equipment, light weights and walking, 3 to 5 times a week for at least 30 minutes.   Héctor Le RN  4/24/2023

## (undated) DEVICE — ANGIOGRAPHY KIT

## (undated) DEVICE — GUIDEWIRE VASC L260CM DIA0.035IN TIP L3MM STD EXCHG PTFE J

## (undated) DEVICE — ANGIOGRAPHIC CATHETER: Brand: IMPULSE™

## (undated) DEVICE — PACK PROCEDURE SURG HRT CATH

## (undated) DEVICE — HI-TORQUE VERSACORE MODIFIED J GUIDE WIRE SYSTEM 145 CM: Brand: HI-TORQUE VERSACORE

## (undated) DEVICE — KIT AT-X65 ANGIOTOUCH HAND CONTROLLER

## (undated) DEVICE — ANGIO-SEAL VIP VASCULAR CLOSURE DEVICE: Brand: ANGIO-SEAL

## (undated) DEVICE — PINNACLE INTRODUCER SHEATH: Brand: PINNACLE

## (undated) DEVICE — CATH 5F 100CM JR40 -- DXTERITY

## (undated) DEVICE — ANGIOGRAPHIC CATHETER: Brand: EXPO™

## (undated) DEVICE — BAG TRASH WST 122 CM 183 CM 1400 CC FEMALE LUER PVC DEPOT

## (undated) DEVICE — KIT MFLD ISOLATN NACL CNTRST PRT TBNG SPIK W/ PRSS TRNSDUC

## (undated) DEVICE — NEEDLE ANGIO 18GAX7CM SECURELOC

## (undated) DEVICE — SPECIAL PROCEDURE DRAPE 32" X 34": Brand: SPECIAL PROCEDURE DRAPE

## (undated) DEVICE — KIT HND CTRL 3 W STPCOCK ROT END 54IN PREM HI PRSS TBNG AT

## (undated) DEVICE — KIT MED IMAG CNTRST AGNT W/ IOPAMIDOL REUSE

## (undated) DEVICE — TR BAND RADIAL ARTERY COMPRESSION DEVICE: Brand: TR BAND

## (undated) DEVICE — GLIDESHEATH SLENDER STAINLESS STEEL KIT: Brand: GLIDESHEATH SLENDER